# Patient Record
Sex: FEMALE | Race: WHITE | HISPANIC OR LATINO | Employment: FULL TIME
[De-identification: names, ages, dates, MRNs, and addresses within clinical notes are randomized per-mention and may not be internally consistent; named-entity substitution may affect disease eponyms.]

---

## 2018-02-04 ENCOUNTER — CHARTING TRANS (OUTPATIENT)
Dept: OTHER | Age: 23
End: 2018-02-04

## 2018-02-04 ENCOUNTER — IMAGING SERVICES (OUTPATIENT)
Dept: OTHER | Age: 23
End: 2018-02-04

## 2018-02-04 ASSESSMENT — PAIN SCALES - GENERAL: PAINLEVEL_OUTOF10: 8

## 2019-03-06 VITALS
OXYGEN SATURATION: 99 % | HEART RATE: 89 BPM | DIASTOLIC BLOOD PRESSURE: 91 MMHG | RESPIRATION RATE: 16 BRPM | TEMPERATURE: 98.9 F | SYSTOLIC BLOOD PRESSURE: 141 MMHG

## 2021-05-15 ENCOUNTER — EMPLOYEE HEALTH (OUTPATIENT)
Dept: OTHER | Age: 26
End: 2021-05-15

## 2021-05-15 ENCOUNTER — LAB SERVICES (OUTPATIENT)
Dept: LAB | Age: 26
End: 2021-05-15

## 2021-05-15 DIAGNOSIS — Z20.822 SUSPECTED COVID-19 VIRUS INFECTION: ICD-10-CM

## 2021-05-15 DIAGNOSIS — Z20.822 SUSPECTED COVID-19 VIRUS INFECTION: Primary | ICD-10-CM

## 2021-05-15 PROCEDURE — U0005 INFEC AGEN DETEC AMPLI PROBE: HCPCS | Performed by: HOSPITALIST

## 2021-05-15 PROCEDURE — U0003 INFECTIOUS AGENT DETECTION BY NUCLEIC ACID (DNA OR RNA); SEVERE ACUTE RESPIRATORY SYNDROME CORONAVIRUS 2 (SARS-COV-2) (CORONAVIRUS DISEASE [COVID-19]), AMPLIFIED PROBE TECHNIQUE, MAKING USE OF HIGH THROUGHPUT TECHNOLOGIES AS DESCRIBED BY CMS-2020-01-R: HCPCS | Performed by: HOSPITALIST

## 2021-05-16 ENCOUNTER — EMPLOYEE HEALTH (OUTPATIENT)
Dept: OTHER | Age: 26
End: 2021-05-16

## 2021-05-16 LAB
SARS-COV-2 RNA RESP QL NAA+PROBE: NOT DETECTED
SERVICE CMNT-IMP: NORMAL
SERVICE CMNT-IMP: NORMAL

## 2021-05-17 ENCOUNTER — EMPLOYEE HEALTH (OUTPATIENT)
Dept: OTHER | Age: 26
End: 2021-05-17

## 2022-04-25 ENCOUNTER — EMPLOYEE HEALTH (OUTPATIENT)
Dept: OTHER | Facility: HOSPITAL | Age: 27
End: 2022-04-25
Attending: PREVENTIVE MEDICINE

## 2022-04-25 DIAGNOSIS — Z11.1 SCREENING-PULMONARY TB: Primary | ICD-10-CM

## 2022-04-25 PROCEDURE — 86480 TB TEST CELL IMMUN MEASURE: CPT

## 2022-04-27 LAB
M TB IFN-G CD4+ T-CELLS BLD-ACNC: 0.09 IU/ML
M TB TUBERC IFN-G BLD QL: NEGATIVE
M TB TUBERC IGNF/MITOGEN IGNF CONTROL: >10 IU/ML
QFT TB1 AG MINUS NIL: 0.13 IU/ML
QFT TB2 AG MINUS NIL: 0 IU/ML

## 2022-06-02 ENCOUNTER — OFFICE VISIT (OUTPATIENT)
Dept: OBGYN CLINIC | Facility: CLINIC | Age: 27
End: 2022-06-02
Payer: COMMERCIAL

## 2022-06-02 ENCOUNTER — LAB ENCOUNTER (OUTPATIENT)
Dept: LAB | Facility: HOSPITAL | Age: 27
End: 2022-06-02
Attending: OBSTETRICS & GYNECOLOGY
Payer: COMMERCIAL

## 2022-06-02 VITALS — SYSTOLIC BLOOD PRESSURE: 102 MMHG | WEIGHT: 117 LBS | DIASTOLIC BLOOD PRESSURE: 62 MMHG

## 2022-06-02 DIAGNOSIS — Z01.419 WOMEN'S ANNUAL ROUTINE GYNECOLOGICAL EXAMINATION: Primary | ICD-10-CM

## 2022-06-02 DIAGNOSIS — Z11.3 SCREENING FOR STDS (SEXUALLY TRANSMITTED DISEASES): ICD-10-CM

## 2022-06-02 LAB
HBV SURFACE AG SER-ACNC: <0.1 [IU]/L
HBV SURFACE AG SERPL QL IA: NONREACTIVE
HCV AB SERPL QL IA: NONREACTIVE

## 2022-06-02 PROCEDURE — 86694 HERPES SIMPLEX NES ANTBDY: CPT

## 2022-06-02 PROCEDURE — 87591 N.GONORRHOEAE DNA AMP PROB: CPT | Performed by: OBSTETRICS & GYNECOLOGY

## 2022-06-02 PROCEDURE — 86780 TREPONEMA PALLIDUM: CPT

## 2022-06-02 PROCEDURE — 87808 TRICHOMONAS ASSAY W/OPTIC: CPT | Performed by: OBSTETRICS & GYNECOLOGY

## 2022-06-02 PROCEDURE — 87491 CHLMYD TRACH DNA AMP PROBE: CPT | Performed by: OBSTETRICS & GYNECOLOGY

## 2022-06-02 PROCEDURE — 87389 HIV-1 AG W/HIV-1&-2 AB AG IA: CPT

## 2022-06-02 PROCEDURE — 87106 FUNGI IDENTIFICATION YEAST: CPT | Performed by: OBSTETRICS & GYNECOLOGY

## 2022-06-02 PROCEDURE — 36415 COLL VENOUS BLD VENIPUNCTURE: CPT

## 2022-06-02 PROCEDURE — 86695 HERPES SIMPLEX TYPE 1 TEST: CPT

## 2022-06-02 PROCEDURE — 87340 HEPATITIS B SURFACE AG IA: CPT

## 2022-06-02 PROCEDURE — 87205 SMEAR GRAM STAIN: CPT | Performed by: OBSTETRICS & GYNECOLOGY

## 2022-06-02 PROCEDURE — 86803 HEPATITIS C AB TEST: CPT

## 2022-06-02 PROCEDURE — 86696 HERPES SIMPLEX TYPE 2 TEST: CPT

## 2022-06-02 RX ORDER — LISDEXAMFETAMINE DIMESYLATE 20 MG/1
CAPSULE ORAL
COMMUNITY
Start: 2022-06-01

## 2022-06-03 LAB
C TRACH DNA SPEC QL NAA+PROBE: NEGATIVE
HSV 1 GLYCOPROTEIN G, IGG: NEGATIVE
HSV 2 GLYCOPROTEIN G, IGG: NEGATIVE
N GONORRHOEA DNA SPEC QL NAA+PROBE: NEGATIVE
T PALLIDUM AB SER QL: NEGATIVE

## 2022-06-04 LAB
GENITAL VAGINOSIS SCREEN: NEGATIVE
TRICHOMONAS SCREEN: NEGATIVE

## 2022-07-06 ENCOUNTER — TELEPHONE (OUTPATIENT)
Dept: OBGYN CLINIC | Facility: CLINIC | Age: 27
End: 2022-07-06

## 2022-07-06 RX ORDER — FLUCONAZOLE 150 MG/1
150 TABLET ORAL ONCE
Qty: 2 TABLET | Refills: 0 | Status: SHIPPED | OUTPATIENT
Start: 2022-07-06 | End: 2022-07-06

## 2022-07-06 NOTE — TELEPHONE ENCOUNTER
Patient name and  verified. Contacted patient as follow up to genital vaginosis panel. Patient complaining of vaginal discharge and would like Diflucan. Order for Diflucan sent to pharmacy per TA result note.

## 2022-11-17 ENCOUNTER — LAB ENCOUNTER (OUTPATIENT)
Dept: LAB | Age: 27
End: 2022-11-17
Attending: OBSTETRICS & GYNECOLOGY
Payer: COMMERCIAL

## 2022-11-17 ENCOUNTER — OFFICE VISIT (OUTPATIENT)
Dept: OBGYN CLINIC | Facility: CLINIC | Age: 27
End: 2022-11-17
Payer: COMMERCIAL

## 2022-11-17 VITALS — DIASTOLIC BLOOD PRESSURE: 70 MMHG | WEIGHT: 123.81 LBS | SYSTOLIC BLOOD PRESSURE: 122 MMHG

## 2022-11-17 DIAGNOSIS — Z13.1 ENCOUNTER FOR SCREENING EXAMINATION FOR IMPAIRED GLUCOSE REGULATION AND DIABETES MELLITUS: ICD-10-CM

## 2022-11-17 DIAGNOSIS — Z13.1 ENCOUNTER FOR SCREENING EXAMINATION FOR IMPAIRED GLUCOSE REGULATION AND DIABETES MELLITUS: Primary | ICD-10-CM

## 2022-11-17 DIAGNOSIS — Z13.220 SCREENING FOR LIPID DISORDERS: ICD-10-CM

## 2022-11-17 DIAGNOSIS — Z11.3 SCREEN FOR STD (SEXUALLY TRANSMITTED DISEASE): ICD-10-CM

## 2022-11-17 DIAGNOSIS — N76.0 VAGINITIS AND VULVOVAGINITIS: ICD-10-CM

## 2022-11-17 LAB
EST. AVERAGE GLUCOSE BLD GHB EST-MCNC: 100 MG/DL (ref 68–126)
HBA1C MFR BLD: 5.1 % (ref ?–5.7)

## 2022-11-17 PROCEDURE — 3078F DIAST BP <80 MM HG: CPT | Performed by: OBSTETRICS & GYNECOLOGY

## 2022-11-17 PROCEDURE — 83036 HEMOGLOBIN GLYCOSYLATED A1C: CPT

## 2022-11-17 PROCEDURE — 3074F SYST BP LT 130 MM HG: CPT | Performed by: OBSTETRICS & GYNECOLOGY

## 2022-11-17 PROCEDURE — 82947 ASSAY GLUCOSE BLOOD QUANT: CPT

## 2022-11-17 PROCEDURE — 99213 OFFICE O/P EST LOW 20 MIN: CPT | Performed by: OBSTETRICS & GYNECOLOGY

## 2022-11-17 PROCEDURE — 80061 LIPID PANEL: CPT

## 2022-11-17 RX ORDER — LISDEXAMFETAMINE DIMESYLATE 30 MG/1
30 CAPSULE ORAL EVERY MORNING
COMMUNITY
Start: 2022-11-14

## 2022-11-18 LAB
C TRACH DNA SPEC QL NAA+PROBE: NEGATIVE
CHOLEST SERPL-MCNC: 155 MG/DL (ref ?–200)
FASTING PATIENT GLUCOSE ANSWER: YES
FASTING PATIENT LIPID ANSWER: YES
GLUCOSE BLD-MCNC: 95 MG/DL (ref 70–99)
HDLC SERPL-MCNC: 103 MG/DL (ref 40–59)
LDLC SERPL CALC-MCNC: 38 MG/DL (ref ?–100)
N GONORRHOEA DNA SPEC QL NAA+PROBE: NEGATIVE
NONHDLC SERPL-MCNC: 52 MG/DL (ref ?–130)
TRIGL SERPL-MCNC: 73 MG/DL (ref 30–149)
VLDLC SERPL CALC-MCNC: 10 MG/DL (ref 0–30)

## 2023-06-12 ENCOUNTER — APPOINTMENT (OUTPATIENT)
Dept: GENERAL RADIOLOGY | Facility: HOSPITAL | Age: 28
End: 2023-06-12
Payer: COMMERCIAL

## 2023-06-12 ENCOUNTER — APPOINTMENT (OUTPATIENT)
Dept: GENERAL RADIOLOGY | Facility: HOSPITAL | Age: 28
End: 2023-06-12
Attending: EMERGENCY MEDICINE
Payer: COMMERCIAL

## 2023-06-12 ENCOUNTER — HOSPITAL ENCOUNTER (EMERGENCY)
Facility: HOSPITAL | Age: 28
Discharge: HOME | End: 2023-06-12
Attending: EMERGENCY MEDICINE
Payer: COMMERCIAL

## 2023-06-12 VITALS
RESPIRATION RATE: 16 BRPM | WEIGHT: 129.19 LBS | DIASTOLIC BLOOD PRESSURE: 84 MMHG | HEART RATE: 58 BPM | SYSTOLIC BLOOD PRESSURE: 134 MMHG | TEMPERATURE: 98 F | OXYGEN SATURATION: 100 %

## 2023-06-12 DIAGNOSIS — M54.12 CERVICAL RADICULOPATHY: Primary | ICD-10-CM

## 2023-06-12 DIAGNOSIS — R07.89 CHEST PAIN, ATYPICAL: ICD-10-CM

## 2023-06-12 LAB
ALBUMIN SERPL-MCNC: 4 G/DL (ref 3.4–5)
ALBUMIN/GLOB SERPL: 1.1 {RATIO} (ref 1–2)
ALP LIVER SERPL-CCNC: 85 U/L
ALT SERPL-CCNC: 17 U/L
ANION GAP SERPL CALC-SCNC: 3 MMOL/L (ref 0–18)
AST SERPL-CCNC: 16 U/L (ref 15–37)
B-HCG UR QL: NEGATIVE
BASOPHILS # BLD AUTO: 0.04 X10(3) UL (ref 0–0.2)
BASOPHILS NFR BLD AUTO: 0.6 %
BILIRUB SERPL-MCNC: 0.3 MG/DL (ref 0.1–2)
BILIRUB UR QL STRIP.AUTO: NEGATIVE
BUN BLD-MCNC: 6 MG/DL (ref 7–18)
CALCIUM BLD-MCNC: 9.5 MG/DL (ref 8.5–10.1)
CHLORIDE SERPL-SCNC: 104 MMOL/L (ref 98–112)
CLARITY UR REFRACT.AUTO: CLEAR
CO2 SERPL-SCNC: 27 MMOL/L (ref 21–32)
CREAT BLD-MCNC: 0.52 MG/DL
EOSINOPHIL # BLD AUTO: 0.06 X10(3) UL (ref 0–0.7)
EOSINOPHIL NFR BLD AUTO: 0.9 %
ERYTHROCYTE [DISTWIDTH] IN BLOOD BY AUTOMATED COUNT: 12.4 %
GFR SERPLBLD BASED ON 1.73 SQ M-ARVRAT: 130 ML/MIN/1.73M2 (ref 60–?)
GLOBULIN PLAS-MCNC: 3.5 G/DL (ref 2.8–4.4)
GLUCOSE BLD-MCNC: 105 MG/DL (ref 70–99)
GLUCOSE UR STRIP.AUTO-MCNC: NEGATIVE MG/DL
HCT VFR BLD AUTO: 43.7 %
HGB BLD-MCNC: 14.5 G/DL
IMM GRANULOCYTES # BLD AUTO: 0.03 X10(3) UL (ref 0–1)
IMM GRANULOCYTES NFR BLD: 0.5 %
KETONES UR STRIP.AUTO-MCNC: NEGATIVE MG/DL
LEUKOCYTE ESTERASE UR QL STRIP.AUTO: NEGATIVE
LYMPHOCYTES # BLD AUTO: 2.38 X10(3) UL (ref 1–4)
LYMPHOCYTES NFR BLD AUTO: 36.3 %
MCH RBC QN AUTO: 30.6 PG (ref 26–34)
MCHC RBC AUTO-ENTMCNC: 33.2 G/DL (ref 31–37)
MCV RBC AUTO: 92.2 FL
MONOCYTES # BLD AUTO: 0.4 X10(3) UL (ref 0.1–1)
MONOCYTES NFR BLD AUTO: 6.1 %
NEUTROPHILS # BLD AUTO: 3.65 X10 (3) UL (ref 1.5–7.7)
NEUTROPHILS # BLD AUTO: 3.65 X10(3) UL (ref 1.5–7.7)
NEUTROPHILS NFR BLD AUTO: 55.6 %
NITRITE UR QL STRIP.AUTO: NEGATIVE
OSMOLALITY SERPL CALC.SUM OF ELEC: 276 MOSM/KG (ref 275–295)
PH UR STRIP.AUTO: 7 [PH] (ref 5–8)
PLATELET # BLD AUTO: 314 10(3)UL (ref 150–450)
POTASSIUM SERPL-SCNC: 3.9 MMOL/L (ref 3.5–5.1)
PROT SERPL-MCNC: 7.5 G/DL (ref 6.4–8.2)
PROT UR STRIP.AUTO-MCNC: NEGATIVE MG/DL
RBC # BLD AUTO: 4.74 X10(6)UL
RBC #/AREA URNS AUTO: >10 /HPF
SODIUM SERPL-SCNC: 134 MMOL/L (ref 136–145)
SP GR UR STRIP.AUTO: 1.01 (ref 1–1.03)
TROPONIN I HIGH SENSITIVITY: 4 NG/L
UROBILINOGEN UR STRIP.AUTO-MCNC: <2 MG/DL
WBC # BLD AUTO: 6.6 X10(3) UL (ref 4–11)

## 2023-06-12 PROCEDURE — 93010 ELECTROCARDIOGRAM REPORT: CPT

## 2023-06-12 PROCEDURE — 87430 STREP A AG IA: CPT

## 2023-06-12 PROCEDURE — 99285 EMERGENCY DEPT VISIT HI MDM: CPT

## 2023-06-12 PROCEDURE — 36415 COLL VENOUS BLD VENIPUNCTURE: CPT

## 2023-06-12 PROCEDURE — 71046 X-RAY EXAM CHEST 2 VIEWS: CPT | Performed by: EMERGENCY MEDICINE

## 2023-06-12 PROCEDURE — 85025 COMPLETE CBC W/AUTO DIFF WBC: CPT

## 2023-06-12 PROCEDURE — 81001 URINALYSIS AUTO W/SCOPE: CPT | Performed by: EMERGENCY MEDICINE

## 2023-06-12 PROCEDURE — 93005 ELECTROCARDIOGRAM TRACING: CPT

## 2023-06-12 PROCEDURE — 81025 URINE PREGNANCY TEST: CPT

## 2023-06-12 PROCEDURE — 99284 EMERGENCY DEPT VISIT MOD MDM: CPT

## 2023-06-12 PROCEDURE — 87430 STREP A AG IA: CPT | Performed by: EMERGENCY MEDICINE

## 2023-06-12 PROCEDURE — 84484 ASSAY OF TROPONIN QUANT: CPT

## 2023-06-12 PROCEDURE — 80053 COMPREHEN METABOLIC PANEL: CPT

## 2023-06-12 PROCEDURE — 72050 X-RAY EXAM NECK SPINE 4/5VWS: CPT | Performed by: EMERGENCY MEDICINE

## 2023-06-12 PROCEDURE — 85025 COMPLETE CBC W/AUTO DIFF WBC: CPT | Performed by: EMERGENCY MEDICINE

## 2023-06-12 PROCEDURE — 81001 URINALYSIS AUTO W/SCOPE: CPT

## 2023-06-12 PROCEDURE — 80053 COMPREHEN METABOLIC PANEL: CPT | Performed by: EMERGENCY MEDICINE

## 2023-06-12 PROCEDURE — 84484 ASSAY OF TROPONIN QUANT: CPT | Performed by: EMERGENCY MEDICINE

## 2023-06-12 RX ORDER — CYCLOBENZAPRINE HCL 10 MG
10 TABLET ORAL 3 TIMES DAILY PRN
Qty: 20 TABLET | Refills: 0 | Status: SHIPPED | OUTPATIENT
Start: 2023-06-12 | End: 2023-06-19

## 2023-06-12 RX ORDER — HYDROCODONE BITARTRATE AND ACETAMINOPHEN 5; 325 MG/1; MG/1
1-2 TABLET ORAL EVERY 6 HOURS PRN
Qty: 10 TABLET | Refills: 0 | Status: SHIPPED | OUTPATIENT
Start: 2023-06-12 | End: 2023-06-19

## 2023-06-12 RX ORDER — HYDROCODONE BITARTRATE AND ACETAMINOPHEN 5; 325 MG/1; MG/1
2 TABLET ORAL ONCE
Status: COMPLETED | OUTPATIENT
Start: 2023-06-12 | End: 2023-06-12

## 2023-06-12 RX ORDER — NAPROXEN 500 MG/1
500 TABLET ORAL 2 TIMES DAILY PRN
Qty: 20 TABLET | Refills: 0 | Status: SHIPPED | OUTPATIENT
Start: 2023-06-12 | End: 2023-06-19

## 2023-06-12 NOTE — ED INITIAL ASSESSMENT (HPI)
Patient to the ER c/o chest pain with left arm pain. Patient reports that she began having symptoms three days ago. Patient also reports feeling like her lymph nodes are swollen with a sore throat. No fever vomiting x1. No diarrhea. Patient is on vivance and took a lower dose today.

## 2023-06-13 LAB
ATRIAL RATE: 66 BPM
P AXIS: 23 DEGREES
P-R INTERVAL: 152 MS
Q-T INTERVAL: 404 MS
QRS DURATION: 82 MS
QTC CALCULATION (BEZET): 423 MS
R AXIS: 56 DEGREES
T AXIS: 42 DEGREES
VENTRICULAR RATE: 66 BPM

## 2023-06-13 NOTE — ED QUICK NOTES
Report from Nikki Alcaraz RN. Pt resting in bed. States pain feels better. Pt in no acute distress.     She was given iced water per request

## 2023-06-13 NOTE — DISCHARGE INSTRUCTIONS
Follow-up for further evaluation with primary physician call for an appointment. Return if new or worse symptoms. Naprosyn first.  Flexeril as needed. Norco for breakthrough pain.

## 2023-06-13 NOTE — ED QUICK NOTES
Checked with pt if her ride is here, \"they're in the parking lot. \" Advised pt to have her ride come to the desk as she was given 2 tabs [place] in the ED, pt drove self here.  States \"Ok\"

## 2023-07-17 PROBLEM — F90.0 ADHD, PREDOMINANTLY INATTENTIVE TYPE: Status: ACTIVE | Noted: 2019-12-19

## 2023-07-18 ENCOUNTER — OFFICE VISIT (OUTPATIENT)
Dept: OBGYN CLINIC | Facility: CLINIC | Age: 28
End: 2023-07-18

## 2023-07-18 VITALS — SYSTOLIC BLOOD PRESSURE: 98 MMHG | WEIGHT: 127.19 LBS | DIASTOLIC BLOOD PRESSURE: 64 MMHG

## 2023-07-18 DIAGNOSIS — Z01.419 ENCOUNTER FOR WELL WOMAN EXAM WITH ROUTINE GYNECOLOGICAL EXAM: Primary | ICD-10-CM

## 2023-07-18 DIAGNOSIS — Z11.3 SCREENING FOR STDS (SEXUALLY TRANSMITTED DISEASES): ICD-10-CM

## 2023-07-18 PROCEDURE — 3074F SYST BP LT 130 MM HG: CPT | Performed by: OBSTETRICS & GYNECOLOGY

## 2023-07-18 PROCEDURE — 3078F DIAST BP <80 MM HG: CPT | Performed by: OBSTETRICS & GYNECOLOGY

## 2023-07-18 PROCEDURE — 99395 PREV VISIT EST AGE 18-39: CPT | Performed by: OBSTETRICS & GYNECOLOGY

## 2023-07-18 NOTE — PROGRESS NOTES
[facility], [facility]  Obstetrics and Gynecology  Gynecology Visit    Patient presents with: Annual          Lise Garg is a 28year old female who presents for Annual exam.    LMP: 2023. Menses regular: Yes. Menstrual flow normal: Moderate. Birth control: No.   Refill n/a  Last Pap Smear: 2022. Any history of abnormal paps: Yes   Last MMG: n/a  Sleep: 6hrs. Diet: Fair. Exercise: Yes. Screening labs/Blood work today: No.     Colonoscopy (if over 40 y/o): n/a. Gardasil:(age 10-37 y/o) Completed. Genetic Cancer screen (if indicated): No.   Flu (Aug-April): 10/25/2022. TDAP (every 10 years) 2019. Additional Problems/concerns: Pt reports no concerns at this time. Next Appt: Wait schedule on [employer]. Immunization History   Administered Date(s) Administered    Covid-19 Vaccine Pfizer 30 mcg/0.3 ml 2021, 2021    Hpv Virus Vaccine 9 [facility] Im 2021, 2021, 2022    Influenza 10/25/2019, 10/27/2021    TDAP 2019         Current Outpatient Medications:     VYVANSE 30 MG Oral Cap, Take 1 capsule (30 mg total) by mouth every morning., Disp: , Rfl:       Sulfa Antibiotics       HIVES    OB History     T0    L0    SAB1  IAB1  Ectopic0  Multiple0  Live Births0     Name of Baby 1: Not recorded    Date: Not recorded     GA: Not recorded     Delivery: Not recorded    Selvin Singhee: Not recorded     Wade Lin: Not recorded    Living: Not recorded    Name of Baby 2: Not recorded    Date: Not recorded     GA: Not recorded     Delivery: Not recorded    Apgar1: Not recorded     Apgar5: Not recorded    Living: Not recorded      History reviewed. No pertinent past medical history.     Past Surgical History:   Procedure Laterality Date    WISDOM TEETH REMOVED      2019       Family History   Problem Relation Age of Onset    Diabetes Maternal Grandmother     Diabetes Maternal Grandfather         Tobacco  Allergies  Meds  Med Hx  Surg Hx  Fam Hx  Soc Hx Social History    Socioeconomic History      Marital status: Unknown      Spouse name: Not on file      Number of children: Not on file      Years of education: Not on file      Highest education level: Not on file    Occupational History      Not on file    Tobacco Use      Smoking status: Some Days      Smokeless tobacco: Current    Substance and Sexual Activity      Alcohol use: Yes        Comment: Occ. Drug use: Never      Sexual activity: Not on file    Other Topics      Concerns:        Not on file    Social History Narrative      Not on file    Social Determinants of Health  Financial Resource Strain: Not on file  Food Insecurity: Not on file  Transportation Needs: Not on file  Physical Activity: Not on file  Stress: Not on file  Social Connections: Not on file  Housing Stability: Not on file    BP 98/64   Wt 127 lb 3.3 oz (57.7 kg)   LMP 07/05/2023     Wt Readings from Last 3 Encounters:  07/18/23 : 127 lb 3.3 oz (57.7 kg)  06/12/23 : 129 lb 3 oz (58.6 kg)  11/17/22 : 123 lb 12.8 oz (56.2 kg)        Health Maintenance   Topic Date Due    Influenza Vaccine (1) 08/01/2021    Screen for Cervical Cancer 11/05/2021    DTaP,Tdap and Td Vaccines (3 - Td or Tdap) 03/18/2025    Hepatitis C Screening Completed    HIV Screening Completed    COVID-19 Vaccine Completed     Review of Systems   General: Present- Feeling well. Not Present- Chills, Fever, Weight Gain and Weight Loss. HEENT: Not Present- Headache and Sore Throat. Respiratory: Not Present- Cough, Difficulty Breathing, Hemoptysis and Sputum Production. Cardiovascular: Not Present- Chest Pain, Elevated Blood Pressure, Fainting / Blacking Out and Shortness of Breath. Gastrointestinal: Not Present- Constipation, Diarrhea, Nausea and Vomiting. Female Genitourinary: Not Present- Discharge, Dysuria and Frequency. Musculoskeletal: Not Present- Leg Cramps and Swelling of Extremities.   Neurological: Not Present- Dizziness and Headaches. Psychiatric: Not Present- Anxiety and Depression. Endocrine: Not Present- Appetite Changes, Hair Changes and Thyroid Problems. Hematology: Not Present- Easy Bruising and Excessive bleeding. All other systems negative     Physical Exam The physical exam findings are as follows:     General   Mental Status - Alert. General Appearance - Cooperative. Orientation - Oriented X4. Build & Nutrition - Well nourished. Head and Neck  Thyroid   Gland Characteristics - normal size and consistency. Chest and Lung Exam   Inspection:   Chest Wall: - Normal.  Percussion:   Quality and Intensity: - Percussion normal.  Palpation: - Palpation normal.  Auscultation:   Breath sounds: - Normal.  Adventitious sounds: - No Adventitious sounds. Breast   Nipples: Characteristics - Bilateral - Normal. Discharge - Bilateral - None. Breast - Bilateral - Symmetric. Cardiovascular   Auscultation: Rhythm - Regular. Heart Sounds - Normal heart sounds. Murmurs & Other Heart Sounds: Auscultation of the heart reveals - No Murmurs. Abdomen   Inspection: Inspection of the abdomen reveals - No Hernias. Incisional scars - no incisional scars. Palpation/Percussion: Palpation and Percussion of the abdomen reveal - Non Tender and No Palpable abdominal masses. Liver: - Normal.  Auscultation: Auscultation of the abdomen reveals - Bowel sounds normal.    Female Genitourinary     External Genitalia   Perineum - Normal. Bartholin's Gland - Bilateral - Normal. Clitoris - Normal.  Introitus: Characteristics - No Cystocele, Enterocele or Rectocele. Discharge - None. Labia Majora: Lesions - Bilateral - None. Characteristics - Bilateral - Normal.  Labia Minora: Lesions - Bilateral - None. Characteristics - Bilateral - Normal.  Urethra: Characteristics - Normal. Discharge - None. [place] Gland - Bilateral - Normal.  Vulva: Characteristics - Normal. Lesions - None.     Speculum & Bimanual   Vagina:   Vaginal Wall: - Normal.  Vaginal Lesions - None. Vaginal Mucosa - Normal.  Cervix: Characteristics - No Motion tenderness. Discharge - None. Uterus: Characteristics - Normal. Position - Midposition. Adnexa: Characteristics - Bilateral - Normal. Masses - No Adnexal Masses. Wet [place]: pH - 3.8-4.2. Vaginal discharge - Clear  and Thin. Amine Odor - Absent. Main patient complaints - Discharge. Microscopy - Lactobacilli and Epithelial cells. Rectal   Anorectal Exam: External - normal external exam.    Peripheral Vascular   Upper Extremity:   Palpation: - Pulses bilaterally normal.  Lower Extremity: Inspection - Bilateral - Inspection Normal.  Palpation: Edema - Bilateral - No edema. Neurologic   Mental Status: - Normal.    Lymphatic  General Lymphatics   Description - Normal .       1. Encounter for well woman exam with routine gynecological exam  - THINPREP PAP WITH HPV REFLEX REQUEST B; Future    2. Screening for STDs (sexually transmitted diseases)  - HIV AG AB COMBO; Future  - T PALLIDUM SCREENING CASCADE; Future  - HCV ANTIBODY; Future  - HEPATITIS B SURFACE ANTIGEN; Future  - VAGINITIS/VAGINOSIS, DNA PROBE  - CHLAMYDIA/GONOCOCCUS, KIERRA;  Future

## 2023-07-19 LAB
C TRACH DNA SPEC QL NAA+PROBE: NEGATIVE
N GONORRHOEA DNA SPEC QL NAA+PROBE: NEGATIVE

## 2023-08-03 LAB — HPV I/H RISK 1 DNA SPEC QL NAA+PROBE: NEGATIVE

## 2023-12-05 ENCOUNTER — APPOINTMENT (OUTPATIENT)
Dept: ULTRASOUND IMAGING | Age: 28
End: 2023-12-05
Attending: PHYSICIAN ASSISTANT
Payer: COMMERCIAL

## 2023-12-05 ENCOUNTER — HOSPITAL ENCOUNTER (OUTPATIENT)
Age: 28
Discharge: HOME | End: 2023-12-05
Payer: COMMERCIAL

## 2023-12-05 VITALS
SYSTOLIC BLOOD PRESSURE: 113 MMHG | DIASTOLIC BLOOD PRESSURE: 74 MMHG | HEART RATE: 86 BPM | OXYGEN SATURATION: 100 % | TEMPERATURE: 98 F | RESPIRATION RATE: 16 BRPM | HEIGHT: 59 IN | BODY MASS INDEX: 25.2 KG/M2 | WEIGHT: 125 LBS

## 2023-12-05 DIAGNOSIS — R10.2 SUPRAPUBIC PAIN: Primary | ICD-10-CM

## 2023-12-05 DIAGNOSIS — N89.8 VAGINAL DISCHARGE: ICD-10-CM

## 2023-12-05 DIAGNOSIS — N89.8 VAGINAL LESION: ICD-10-CM

## 2023-12-05 LAB
B-HCG UR QL: NEGATIVE
BILIRUB UR QL STRIP: NEGATIVE
CLARITY UR: CLEAR
COLOR UR: YELLOW
GLUCOSE UR STRIP-MCNC: NEGATIVE MG/DL
KETONES UR STRIP-MCNC: NEGATIVE MG/DL
LEUKOCYTE ESTERASE UR QL STRIP: NEGATIVE
NITRITE UR QL STRIP: NEGATIVE
PH UR STRIP: 7 [PH]
PROT UR STRIP-MCNC: NEGATIVE MG/DL
SP GR UR STRIP: 1.01
UROBILINOGEN UR STRIP-ACNC: <2 MG/DL

## 2023-12-05 PROCEDURE — 87491 CHLMYD TRACH DNA AMP PROBE: CPT | Performed by: PHYSICIAN ASSISTANT

## 2023-12-05 PROCEDURE — 87086 URINE CULTURE/COLONY COUNT: CPT | Performed by: PHYSICIAN ASSISTANT

## 2023-12-05 PROCEDURE — 87529 HSV DNA AMP PROBE: CPT | Performed by: PHYSICIAN ASSISTANT

## 2023-12-05 PROCEDURE — 81002 URINALYSIS NONAUTO W/O SCOPE: CPT

## 2023-12-05 PROCEDURE — 99215 OFFICE O/P EST HI 40 MIN: CPT

## 2023-12-05 PROCEDURE — 96372 THER/PROPH/DIAG INJ SC/IM: CPT

## 2023-12-05 PROCEDURE — 87591 N.GONORRHOEAE DNA AMP PROB: CPT | Performed by: PHYSICIAN ASSISTANT

## 2023-12-05 PROCEDURE — 81514 NFCT DS BV&VAGINITIS DNA ALG: CPT | Performed by: PHYSICIAN ASSISTANT

## 2023-12-05 PROCEDURE — 81025 URINE PREGNANCY TEST: CPT

## 2023-12-05 PROCEDURE — 99214 OFFICE O/P EST MOD 30 MIN: CPT

## 2023-12-05 PROCEDURE — 76856 US EXAM PELVIC COMPLETE: CPT | Performed by: PHYSICIAN ASSISTANT

## 2023-12-05 PROCEDURE — 93975 VASCULAR STUDY: CPT | Performed by: PHYSICIAN ASSISTANT

## 2023-12-05 RX ORDER — ACETAMINOPHEN 500 MG
500 TABLET ORAL EVERY 6 HOURS PRN
COMMUNITY

## 2023-12-05 RX ORDER — KETOROLAC TROMETHAMINE 30 MG/ML
15 INJECTION, SOLUTION INTRAMUSCULAR; INTRAVENOUS ONCE
Status: COMPLETED | OUTPATIENT
Start: 2023-12-05 | End: 2023-12-05

## 2023-12-05 NOTE — ED INITIAL ASSESSMENT (HPI)
Pt with suprapubic pain x 4-5 days, painful urination, white vaginal discharge and R back and RLQ pain  (feels like RLQ is swollen) x 2 days; today with fever to 101f

## 2023-12-05 NOTE — DISCHARGE INSTRUCTIONS
You will be notified of your lab results within 48-72 hours. If prescriptions are needed at that time, they will be sent to your pharmacy of choice. Go to the Emergency department for any worsening symptoms.

## 2023-12-06 LAB
BV BACTERIA DNA VAG QL NAA+PROBE: NEGATIVE
C GLABRATA DNA VAG QL NAA+PROBE: NEGATIVE
C KRUSEI DNA VAG QL NAA+PROBE: NEGATIVE
C TRACH DNA SPEC QL NAA+PROBE: NEGATIVE
CANDIDA DNA VAG QL NAA+PROBE: NEGATIVE
N GONORRHOEA DNA SPEC QL NAA+PROBE: NEGATIVE
T VAGINALIS DNA VAG QL NAA+PROBE: NEGATIVE

## 2023-12-07 ENCOUNTER — TELEPHONE (OUTPATIENT)
Dept: URGENT CARE | Age: 28
End: 2023-12-07

## 2023-12-07 RX ORDER — VALACYCLOVIR HYDROCHLORIDE 1 G/1
1000 TABLET, FILM COATED ORAL EVERY 12 HOURS
Qty: 20 TABLET | Refills: 0 | Status: SHIPPED | OUTPATIENT
Start: 2023-12-07 | End: 2023-12-17

## 2023-12-08 LAB
HSV 1 NAA: POSITIVE
HSV 1 NAA: POSITIVE
HSV 2 NAA: NEGATIVE
HSV 2 NAA: NEGATIVE

## 2023-12-08 NOTE — ED NOTES
Patient called stating vaginal lesion is spreading and more painful. Chart reviewed by TREY Benson still in process. Valcyclovir eprescribed to pharmacy on file.    Voiced understanding

## 2024-02-19 ENCOUNTER — HOSPITAL ENCOUNTER (OUTPATIENT)
Age: 29
Discharge: HOME | End: 2024-02-19
Payer: COMMERCIAL

## 2024-02-19 ENCOUNTER — TELEPHONE (OUTPATIENT)
Dept: OBGYN CLINIC | Facility: CLINIC | Age: 29
End: 2024-02-19

## 2024-02-19 VITALS
HEIGHT: 59 IN | HEART RATE: 83 BPM | SYSTOLIC BLOOD PRESSURE: 136 MMHG | DIASTOLIC BLOOD PRESSURE: 71 MMHG | RESPIRATION RATE: 20 BRPM | TEMPERATURE: 99 F | OXYGEN SATURATION: 99 % | BODY MASS INDEX: 24.19 KG/M2 | WEIGHT: 120 LBS

## 2024-02-19 DIAGNOSIS — N92.6 MISSED MENSES: Primary | ICD-10-CM

## 2024-02-19 LAB — B-HCG UR QL: NEGATIVE

## 2024-02-19 NOTE — TELEPHONE ENCOUNTER
Lmp 01/05/24. Pt regular with her cycles. Pt took Plan B on 01/13/24 and had light spotting after taking. Pt has not had her menses yet for February. Pt took at home pregnancy test and had urine pregnancy test done at [facility] which was negative. Pt was advised to get a hold of her OB/Gyn and have a Hcg level done. Order placed for Hcg. Pt informed to complete test.

## 2024-02-19 NOTE — ED INITIAL ASSESSMENT (HPI)
Pt presents to the IC with c/o no period since the beginning of January. Pt did take plan B on 1/13/24 but only has had spotting since and not a full period.

## 2024-02-19 NOTE — ED PROVIDER NOTES
Patient Seen in: [facility]      History     Chief Complaint   Patient presents with    Gynecologic Exam     Late period - Entered by patient     Stated Complaint: Gynecologic Exam - Late period    Subjective:   HPI    28-year-old female presents requesting a blood test for pregnancy.  She states that she missed her period in January.  She does report taking Plan B on January 13 and has had spotting but no full period.  No pain.  No urinary complaints.  Home pregnancy test was negative.    Objective:   History reviewed. No pertinent past medical history.           Past Surgical History:   Procedure Laterality Date    WISDOM TEETH REMOVED      2019                Social History     Socioeconomic History    Marital status: Single   Tobacco Use    Smoking status: Some Days    Smokeless tobacco: Current   Substance and Sexual Activity    Alcohol use: Yes     Comment: Occ.     Drug use: Never              Review of Systems    Positive for stated complaint: Gynecologic Exam - Late period  Other systems are as noted in HPI.  Constitutional and vital signs reviewed.      All other systems reviewed and negative except as noted above.    Physical Exam     ED Triage Vitals [02/19/24 1123]   /71   Pulse 83   Resp 20   Temp 98.8 °F (37.1 °C)   Temp src Temporal   SpO2 99 %   O2 Device None (Room air)       Current:/71   Pulse 83   Temp 98.8 °F (37.1 °C) (Temporal)   Resp 20   Ht 149.9 cm (4' 11\")   Wt 54.4 kg   LMP 01/05/2024 (Approximate)   SpO2 99%   BMI 24.24 kg/m²         Physical Exam  Vitals and nursing note reviewed.   Constitutional:       Appearance: Normal appearance.   Abdominal:      Tenderness: There is no abdominal tenderness.   Skin:     General: Skin is warm and dry.   Neurological:      Mental Status: She is alert and oriented to person, place, and time.               ED Course     Labs Reviewed   POCT PREGNANCY URINE - Normal                      MDM      28-year-old female  presents with missed period  Amenorrhea /dysfunctional uterine bleeding versus pregnancy  Urine pregnancy is negative  No abdominal pain no fever  Patient to contact her OB/GYN for appointment to obtain a blood test.  She will contact her for outpatient quantitative hCG if needed.  For any new or worsening symptoms patient will go to the ER                               Medical Decision Making  Problems Addressed:  Missed menses: acute illness or injury    Risk  OTC drugs.        Disposition and Plan     Clinical Impression:  1. Missed menses         Disposition:  Discharge  2/19/2024 12:00 pm    Follow-up:  Jimy Ho MD  [facility] [address]  [phone]    Schedule an appointment as soon as possible for a visit   As needed          Medications Prescribed:  Current Discharge Medication List

## 2024-02-19 NOTE — DISCHARGE INSTRUCTIONS
Repeat urine pregnancy test in a few days or follow-up with your OB/GYN for quantitative hCG blood test Go to the emergency room for any new or worsening symptoms such as severe abdominal pain cramping

## 2024-02-19 NOTE — TELEPHONE ENCOUNTER
Patient was seen at immediate care today because her period is late. They suggested she call this office to request an order for lab work. Please advise.

## 2024-02-20 ENCOUNTER — LAB ENCOUNTER (OUTPATIENT)
Dept: LAB | Age: 29
End: 2024-02-20
Attending: OBSTETRICS & GYNECOLOGY
Payer: COMMERCIAL

## 2024-02-20 DIAGNOSIS — N92.6 MISSED MENSES: ICD-10-CM

## 2024-02-20 LAB — B-HCG SERPL-ACNC: <2.6 MIU/ML

## 2024-02-20 PROCEDURE — 84702 CHORIONIC GONADOTROPIN TEST: CPT

## 2024-02-20 PROCEDURE — 36415 COLL VENOUS BLD VENIPUNCTURE: CPT

## 2024-02-26 ENCOUNTER — HOSPITAL ENCOUNTER (OUTPATIENT)
Age: 29
Discharge: HOME | End: 2024-02-26
Payer: COMMERCIAL

## 2024-02-26 VITALS
HEART RATE: 91 BPM | TEMPERATURE: 97 F | SYSTOLIC BLOOD PRESSURE: 130 MMHG | RESPIRATION RATE: 20 BRPM | OXYGEN SATURATION: 100 % | DIASTOLIC BLOOD PRESSURE: 81 MMHG

## 2024-02-26 DIAGNOSIS — R68.83 CHILLS: ICD-10-CM

## 2024-02-26 DIAGNOSIS — J06.9 UPPER RESPIRATORY TRACT INFECTION, UNSPECIFIED TYPE: ICD-10-CM

## 2024-02-26 DIAGNOSIS — J20.9 ACUTE BRONCHITIS WITH BRONCHOSPASM: Primary | ICD-10-CM

## 2024-02-26 LAB
POCT INFLUENZA A: NEGATIVE
POCT INFLUENZA B: NEGATIVE
S PYO AG THROAT QL: NEGATIVE

## 2024-02-26 PROCEDURE — 87880 STREP A ASSAY W/OPTIC: CPT | Performed by: NURSE PRACTITIONER

## 2024-02-26 PROCEDURE — 99213 OFFICE O/P EST LOW 20 MIN: CPT | Performed by: NURSE PRACTITIONER

## 2024-02-26 PROCEDURE — 87502 INFLUENZA DNA AMP PROBE: CPT | Performed by: NURSE PRACTITIONER

## 2024-02-26 RX ORDER — BENZONATATE 200 MG/1
200 CAPSULE ORAL 3 TIMES DAILY PRN
Qty: 15 CAPSULE | Refills: 0 | Status: SHIPPED | OUTPATIENT
Start: 2024-02-26 | End: 2024-02-26

## 2024-02-26 RX ORDER — ALBUTEROL SULFATE 90 UG/1
2 AEROSOL, METERED RESPIRATORY (INHALATION) EVERY 4 HOURS PRN
Qty: 1 EACH | Refills: 0 | Status: SHIPPED | OUTPATIENT
Start: 2024-02-26 | End: 2024-02-26

## 2024-02-26 RX ORDER — BENZONATATE 200 MG/1
200 CAPSULE ORAL 3 TIMES DAILY PRN
Qty: 15 CAPSULE | Refills: 0 | Status: SHIPPED | OUTPATIENT
Start: 2024-02-26

## 2024-02-26 RX ORDER — NAPROXEN 500 MG/1
500 TABLET ORAL 2 TIMES DAILY PRN
Qty: 20 TABLET | Refills: 0 | Status: SHIPPED | OUTPATIENT
Start: 2024-02-26 | End: 2024-03-07

## 2024-02-26 RX ORDER — ALBUTEROL SULFATE 90 UG/1
2 AEROSOL, METERED RESPIRATORY (INHALATION) EVERY 4 HOURS PRN
Qty: 1 EACH | Refills: 0 | Status: SHIPPED | OUTPATIENT
Start: 2024-02-26 | End: 2024-03-27

## 2024-02-26 RX ORDER — NAPROXEN 500 MG/1
500 TABLET ORAL 2 TIMES DAILY PRN
Qty: 20 TABLET | Refills: 0 | Status: SHIPPED | OUTPATIENT
Start: 2024-02-26 | End: 2024-02-26

## 2024-02-26 NOTE — ED PROVIDER NOTES
Patient Seen in: [facility]      History     Chief Complaint   Patient presents with    Cough    Sore Throat     Stated Complaint: Cold - Cough for a week, fever and chills    Subjective:   HPI  Patient is an 28-year-old female that presents to the immediate care center today with concern for fever, chills, cough, congestion, and sore throat that started a week.  Her boyfriend tested positive today for influenza.  Pt. has been eating and drinking without difficulty.  She feels wheezy when supine. She has had no abdominal or back pain; no headache or dizziness.        Objective:   History reviewed. No pertinent past medical history.           Past Surgical History:   Procedure Laterality Date    WISDOM TEETH REMOVED      2019                Social History     Socioeconomic History    Marital status: Single   Tobacco Use    Smoking status: Some Days    Smokeless tobacco: Current   Substance and Sexual Activity    Alcohol use: Yes     Comment: Occ.     Drug use: Never              Review of Systems   Constitutional:  Positive for fever. Negative for appetite change and chills.   HENT:  Positive for congestion, sinus pressure and sore throat.    Respiratory:  Positive for cough.    Gastrointestinal:  Negative for abdominal pain.   Musculoskeletal:  Negative for arthralgias and myalgias.   Skin:  Negative for rash.   Neurological:  Negative for dizziness, weakness and headaches.       Positive for stated complaint: Cold - Cough for a week, fever and chills  Other systems are as noted in HPI.  Constitutional and vital signs reviewed.      All other systems reviewed and negative except as noted above.    Physical Exam     ED Triage Vitals [02/26/24 0912]   /81   Pulse 91   Resp 20   Temp 97.4 °F (36.3 °C)   Temp src Temporal   SpO2 100 %   O2 Device None (Room air)       Current:/81   Pulse 91   Temp 97.4 °F (36.3 °C) (Temporal)   Resp 20   LMP 02/22/2024 (Approximate)   SpO2 100%          Physical Exam  Vitals and nursing note reviewed.   Constitutional:       General: She is not in acute distress.     Appearance: She is not ill-appearing.   HENT:      Right Ear: Tympanic membrane and ear canal normal.      Left Ear: Tympanic membrane and ear canal normal.      Nose: Nose normal.      Mouth/Throat:      Pharynx: Uvula midline. Posterior oropharyngeal erythema present. No uvula swelling.      Tonsils: No tonsillar exudate. 0 on the right. 0 on the left.   Eyes:      Conjunctiva/sclera: Conjunctivae normal.   Pulmonary:      Effort: Pulmonary effort is normal. No respiratory distress.      Breath sounds: Normal breath sounds.   Musculoskeletal:      Cervical back: Normal range of motion and neck supple.   Skin:     General: Skin is warm and dry.      Findings: No rash.   Neurological:      Mental Status: She is alert and oriented to person, place, and time.               ED Course     Labs Reviewed   POCT RAPID STREP - Normal   POCT FLU TEST - Normal    Narrative:     This assay is a rapid molecular in vitro test utilizing nucleic acid amplification of influenza A and B viral RNA.                      MDM                                         Medical Decision Making  Differential diagnoses considered included, but are not exclusive of: bacterial vs viral sinusitis, dehydration, pneumonia, influenza, Covid-19 infection, and other viral upper respiratory infection.       Amount and/or Complexity of Data Reviewed  Labs:  Decision-making details documented in ED Course.    Risk  OTC drugs.  Prescription drug management.        Disposition and Plan     Clinical Impression:  1. Acute bronchitis with bronchospasm    2. Chills    3. Upper respiratory tract infection, unspecified type         Disposition:  Discharge  2/26/2024  9:46 am    Follow-up:  Jimy Ho MD  [address] [facility] [address] [phone]    Schedule an appointment as soon as possible for a visit in 1  week  As needed          Medications Prescribed:  Current Discharge Medication List        START taking these medications    Details   albuterol 108 (90 Base) MCG/ACT Inhalation Aero Soln Inhale 2 puffs into the lungs every 4 (four) hours as needed for Wheezing.  Qty: 1 each, Refills: 0      benzonatate 200 MG Oral Cap Take 1 capsule (200 mg total) by mouth 3 (three) times daily as needed for cough.  Qty: 15 capsule, Refills: 0      naproxen 500 MG Oral Tab Take 1 tablet (500 mg total) by mouth 2 (two) times daily as needed.  Qty: 20 tablet, Refills: 0

## 2024-02-26 NOTE — ED INITIAL ASSESSMENT (HPI)
Pt with cough, sore throat, chills, and fever (t-max 102) x1 wk; testing negative to covid each day; last motrin at 6am

## 2024-03-12 ENCOUNTER — HOSPITAL ENCOUNTER (OUTPATIENT)
Age: 29
Discharge: HOME | End: 2024-03-12
Attending: EMERGENCY MEDICINE
Payer: COMMERCIAL

## 2024-03-12 ENCOUNTER — APPOINTMENT (OUTPATIENT)
Dept: GENERAL RADIOLOGY | Age: 29
End: 2024-03-12
Attending: EMERGENCY MEDICINE
Payer: COMMERCIAL

## 2024-03-12 VITALS
SYSTOLIC BLOOD PRESSURE: 112 MMHG | DIASTOLIC BLOOD PRESSURE: 66 MMHG | RESPIRATION RATE: 16 BRPM | OXYGEN SATURATION: 100 % | TEMPERATURE: 99 F | HEART RATE: 70 BPM

## 2024-03-12 DIAGNOSIS — R05.3 PERSISTENT COUGH FOR 3 WEEKS OR LONGER: Primary | ICD-10-CM

## 2024-03-12 LAB
#MXD IC: 0.4 X10ˆ3/UL (ref 0.1–1)
ATRIAL RATE: 74 BPM
BUN BLD-MCNC: <5 MG/DL (ref 7–18)
CHLORIDE BLD-SCNC: 102 MMOL/L (ref 98–112)
CO2 BLD-SCNC: 25 MMOL/L (ref 21–32)
CREAT BLD-MCNC: 0.4 MG/DL
DDIMER WHOLE BLOOD: <200 NG/ML DDU (ref ?–400)
EGFRCR SERPLBLD CKD-EPI 2021: 137 ML/MIN/1.73M2 (ref 60–?)
GLUCOSE BLD-MCNC: 97 MG/DL (ref 70–99)
HCT VFR BLD AUTO: 41.4 %
HCT VFR BLD CALC: 44 %
HGB BLD-MCNC: 13.5 G/DL
ISTAT IONIZED CALCIUM FOR CHEM 8: 1.23 MMOL/L (ref 1.12–1.32)
LYMPHOCYTES # BLD AUTO: 2.2 X10ˆ3/UL (ref 1–4)
LYMPHOCYTES NFR BLD AUTO: 30.4 %
MCH RBC QN AUTO: 30.1 PG (ref 26–34)
MCHC RBC AUTO-ENTMCNC: 32.6 G/DL (ref 31–37)
MCV RBC AUTO: 92.2 FL (ref 80–100)
MIXED CELL %: 5.3 %
NEUTROPHILS # BLD AUTO: 4.7 X10ˆ3/UL (ref 1.5–7.7)
NEUTROPHILS NFR BLD AUTO: 64.3 %
P AXIS: 42 DEGREES
P-R INTERVAL: 154 MS
PLATELET # BLD AUTO: 350 X10ˆ3/UL (ref 150–450)
POTASSIUM BLD-SCNC: 4.1 MMOL/L (ref 3.6–5.1)
Q-T INTERVAL: 388 MS
QRS DURATION: 78 MS
QTC CALCULATION (BEZET): 430 MS
R AXIS: 59 DEGREES
RBC # BLD AUTO: 4.49 X10ˆ6/UL
SODIUM BLD-SCNC: 138 MMOL/L (ref 136–145)
T AXIS: 46 DEGREES
TROPONIN I BLD-MCNC: <0.02 NG/ML
VENTRICULAR RATE: 74 BPM
WBC # BLD AUTO: 7.3 X10ˆ3/UL (ref 4–11)

## 2024-03-12 PROCEDURE — 84484 ASSAY OF TROPONIN QUANT: CPT

## 2024-03-12 PROCEDURE — 93010 ELECTROCARDIOGRAM REPORT: CPT

## 2024-03-12 PROCEDURE — 85025 COMPLETE CBC W/AUTO DIFF WBC: CPT | Performed by: EMERGENCY MEDICINE

## 2024-03-12 PROCEDURE — 71046 X-RAY EXAM CHEST 2 VIEWS: CPT | Performed by: EMERGENCY MEDICINE

## 2024-03-12 PROCEDURE — 99215 OFFICE O/P EST HI 40 MIN: CPT

## 2024-03-12 PROCEDURE — 85378 FIBRIN DEGRADE SEMIQUANT: CPT | Performed by: EMERGENCY MEDICINE

## 2024-03-12 PROCEDURE — 36415 COLL VENOUS BLD VENIPUNCTURE: CPT

## 2024-03-12 PROCEDURE — 80047 BASIC METABLC PNL IONIZED CA: CPT

## 2024-03-12 PROCEDURE — 93005 ELECTROCARDIOGRAM TRACING: CPT

## 2024-03-12 RX ORDER — AZITHROMYCIN 250 MG/1
TABLET, FILM COATED ORAL
Qty: 6 TABLET | Refills: 0 | Status: SHIPPED | OUTPATIENT
Start: 2024-03-12 | End: 2024-03-17

## 2024-03-12 RX ORDER — ALBUTEROL SULFATE 90 UG/1
2 AEROSOL, METERED RESPIRATORY (INHALATION) EVERY 4 HOURS PRN
Qty: 1 EACH | Refills: 0 | Status: SHIPPED | OUTPATIENT
Start: 2024-03-12 | End: 2024-04-11

## 2024-03-12 RX ORDER — PREDNISONE 20 MG/1
40 TABLET ORAL DAILY
Qty: 10 TABLET | Refills: 0 | Status: SHIPPED | OUTPATIENT
Start: 2024-03-12 | End: 2024-03-17

## 2024-03-12 NOTE — ED PROVIDER NOTES
Patient Seen in: [facility] [place]      History     Chief Complaint   Patient presents with    Cough     Cough for a month with chest pain/ pressure - Entered by patient     Stated Complaint: Cough - Cough for a month with chest pain/ pressure    Subjective:   HPI    The patient is a 28-year-old female with no significant past medical history who presents now with persistent cough, \"chest tightness\", mild shortness of breath.  The patient states that she began experiencing a cough approximately 1 month ago.  After approximately 1 week, the patient was seen by her primary MD and had negative COVID, negative strep, negative flu testing done.  Patient was prescribed Naprosyn and an inhaler.  Despite this, the patient states she continues to have a persistent cough.  Over the last several days, the patient has developed some \"chest tightness\" associated with some transient shortness of breath.  The patient states the symptoms are worst when she is experiencing a spasm of coughing.  The patient states she has some mild chest tightness and minimal shortness of breath throughout the day today.  Patient denies any known family history of premature coronary disease or clotting disorders.    Objective:   History reviewed. No pertinent past medical history.           Past Surgical History:   Procedure Laterality Date    WISDOM TEETH REMOVED      2019                Social History     Socioeconomic History    Marital status: Single   Tobacco Use    Smoking status: Some Days    Smokeless tobacco: Current   Substance and Sexual Activity    Alcohol use: Yes     Comment: Occ.     Drug use: Never              Review of Systems    Positive for stated complaint: Cough - Cough for a month with chest pain/ pressure  Other systems are as noted in HPI.  Constitutional and vital signs reviewed.      All other systems reviewed and negative except as noted above.    Physical Exam     ED Triage Vitals [03/12/24 1401]   /66    Pulse 70   Resp 16   Temp 98.5 °F (36.9 °C)   Temp src Temporal   SpO2 100 %   O2 Device None (Room air)       Current:/66   Pulse 70   Temp 98.5 °F (36.9 °C) (Temporal)   Resp 16   LMP 02/22/2024 (Approximate)   SpO2 100%         Physical Exam    Constitutional: Well-developed well-nourished in no acute distress  Head: Normocephalic, no swelling or tenderness  Eyes: Nonicteric sclera, no conjunctival injection  ENT: TMs are clear and flat bilaterally.  There is no posterior pharyngeal erythema  Chest: Clear to auscultation, no tenderness  Cardiovascular: Regular rate and rhythm without murmur  Abdomen: Soft, nontender and nondistended  Neurologic: Patient is awake, alert and oriented ×3.  The patient's motor strength is 5 out of 5 and symmetric in the upper and lower extremities bilaterally  Extremities: No focal swelling or tenderness  Skin: No pallor, no redness or warmth to the touch      ED Course     Labs Reviewed   POCT ISTAT CHEM8 CARTRIDGE - Abnormal; Notable for the following components:       Result Value    ISTAT BUN <5 (*)     ISTAT Creatinine 0.40 (*)     All other components within normal limits   D-DIMER (POC) - Normal   ISTAT TROPONIN - Normal   POCT CBC     EKG    Rate, intervals and axes as noted on EKG Report.  Rate: 74  Rhythm: Sinus Rhythm  Reading: Patient's EKG demonstrates a sinus rhythm with a rate of 74.  The patient's axis and intervals are normal.  There is no acute ST elevation.           The patient's x-ray result was independently reviewed by myself.  There is no infiltrate or acute intrathoracic process noted.   Patient's lab results including normal CBC/electrolytes, negative troponin/D-dimer were reviewed with the patient.  Patient will try short course of steroids for possible intermittent bronchospasm.  The risk and benefits of antibiotics were discussed with the patient.  Possibility of pertussis was reviewed.  The patient prefers treatment.  Will initiate  Zithromax.  Will provide with pulmonary follow-up.     Pulse ox is 100% on room air, normal.  Vital signs are stable         MDM      shortness of breath including pulmonary infectious process, COPD, asthma, pulmonary embolus and congestive heart failure                                     Medical Decision Making      Disposition and Plan     Clinical Impression:  1. Persistent cough for 3 weeks or longer         Disposition:  Discharge  3/12/2024  2:55 pm    Follow-up:  Deshaun Villatoro DO  [address] [facility] [address], [address] [place] [address]  [phone]      Call for an appointment          Medications Prescribed:  Current Discharge Medication List        START taking these medications    Details   !! albuterol 108 (90 Base) MCG/ACT Inhalation Aero Soln Inhale 2 puffs into the lungs every 4 (four) hours as needed for Wheezing.  Qty: 1 each, Refills: 0      predniSONE 20 MG Oral Tab Take 2 tablets (40 mg total) by mouth daily for 5 days.  Qty: 10 tablet, Refills: 0      azithromycin (ZITHROMAX Z-SAGE) 250 MG Oral Tab 500 mg once followed by 250 mg daily x 4 days  Qty: 6 tablet, Refills: 0       !! - Potential duplicate medications found. Please discuss with provider.

## 2024-03-12 NOTE — ED INITIAL ASSESSMENT (HPI)
Patient arrives ambulatory with c/o cough x 1 month. Patient reports coughing attacks and that she will have chest pressure and shortness of breath during coughing attack. No chest pressure/shortness of breath at rest.

## 2024-05-13 ENCOUNTER — LAB ENCOUNTER (OUTPATIENT)
Dept: LAB | Facility: HOSPITAL | Age: 29
End: 2024-05-13
Attending: OBSTETRICS & GYNECOLOGY

## 2024-05-13 DIAGNOSIS — N92.6 MISSED MENSES: ICD-10-CM

## 2024-05-13 LAB
B-HCG SERPL-ACNC: 1072.3 MIU/ML
PROGEST SERPL-MCNC: 17.39 NG/ML

## 2024-05-13 PROCEDURE — 84702 CHORIONIC GONADOTROPIN TEST: CPT

## 2024-05-13 PROCEDURE — 84144 ASSAY OF PROGESTERONE: CPT

## 2024-05-13 PROCEDURE — 36415 COLL VENOUS BLD VENIPUNCTURE: CPT

## 2024-05-15 ENCOUNTER — LAB ENCOUNTER (OUTPATIENT)
Dept: LAB | Facility: HOSPITAL | Age: 29
End: 2024-05-15
Attending: OBSTETRICS & GYNECOLOGY

## 2024-05-15 DIAGNOSIS — N92.6 MISSED MENSES: ICD-10-CM

## 2024-05-15 LAB
B-HCG SERPL-ACNC: 2760.4 MIU/ML
PROGEST SERPL-MCNC: 15.93 NG/ML

## 2024-05-15 PROCEDURE — 84702 CHORIONIC GONADOTROPIN TEST: CPT

## 2024-05-15 PROCEDURE — 36415 COLL VENOUS BLD VENIPUNCTURE: CPT

## 2024-05-15 PROCEDURE — 84144 ASSAY OF PROGESTERONE: CPT

## 2024-05-16 ENCOUNTER — TELEPHONE (OUTPATIENT)
Dept: OBGYN CLINIC | Facility: CLINIC | Age: 29
End: 2024-05-16

## 2024-05-16 DIAGNOSIS — Z34.90 EARLY STAGE OF PREGNANCY (HCC): Primary | ICD-10-CM

## 2024-05-16 NOTE — TELEPHONE ENCOUNTER
Tried to call patient to go over results and recommendations by . MyChart sent as call dropped. Needs to schedule US. Order placed in file

## 2024-05-24 ENCOUNTER — HOSPITAL ENCOUNTER (OUTPATIENT)
Dept: ULTRASOUND IMAGING | Age: 29
Discharge: HOME | End: 2024-05-24
Attending: OBSTETRICS & GYNECOLOGY

## 2024-05-24 DIAGNOSIS — Z34.90 EARLY STAGE OF PREGNANCY (HCC): ICD-10-CM

## 2024-05-24 PROCEDURE — 76817 TRANSVAGINAL US OBSTETRIC: CPT | Performed by: OBSTETRICS & GYNECOLOGY

## 2024-05-24 PROCEDURE — 76801 OB US < 14 WKS SINGLE FETUS: CPT | Performed by: OBSTETRICS & GYNECOLOGY

## 2024-06-11 ENCOUNTER — OFFICE VISIT (OUTPATIENT)
Dept: OBGYN CLINIC | Facility: CLINIC | Age: 29
End: 2024-06-11

## 2024-06-11 VITALS
HEIGHT: 59 IN | WEIGHT: 124 LBS | SYSTOLIC BLOOD PRESSURE: 110 MMHG | DIASTOLIC BLOOD PRESSURE: 62 MMHG | BODY MASS INDEX: 25 KG/M2

## 2024-06-11 DIAGNOSIS — O02.1 MISSED ABORTION (HCC): Primary | ICD-10-CM

## 2024-06-11 PROCEDURE — 99214 OFFICE O/P EST MOD 30 MIN: CPT | Performed by: OBSTETRICS & GYNECOLOGY

## 2024-06-11 PROCEDURE — 76830 TRANSVAGINAL US NON-OB: CPT | Performed by: OBSTETRICS & GYNECOLOGY

## 2024-06-11 NOTE — PROGRESS NOTES
Chief Complaint   Patient presents with    Ultrasound         Marixa Rodriguez is a 28 year old female who presents for ultrasound. Viability of pregnancy    LMP: 2024.    Menses regular: yes.    Menstrual flow normal: yes.    Birth control or HRT: 0.   Refill 0  Last Pap Smear: 2022 . Any history of abnormal paps: No hx abn   Gardasil:(age 9-44 y/o) up to date.   Any medication refills needed today?: no    Problems/concerns: viability of pregnancy.      Next Appt: n/a        Immunization History   Administered Date(s) Administered    Covid-19 Vaccine Pfizer 30 mcg/0.3 ml 2021, 2021    FLUZONE 6 months and older PFS 0.5 ml (08893) 10/25/2022    Hpv Virus Vaccine 9 [facility] Im 2021, 2021, 2022    Influenza 10/25/2019, 10/27/2021    TDAP 2019         Current Outpatient Medications:     benzonatate 200 MG Oral Cap, Take 1 capsule (200 mg total) by mouth 3 (three) times daily as needed for cough., Disp: 15 capsule, Rfl: 0    acetaminophen 500 MG Oral Tab, Take 1 tablet (500 mg total) by mouth every 6 (six) hours as needed for Pain., Disp: , Rfl:     VYVANSE 30 MG Oral Cap, Take 1 capsule (30 mg total) by mouth every morning., Disp: , Rfl:     Allergies   Allergen Reactions    Sulfa Antibiotics HIVES       OB History    Para Term  AB Living   2 0 0 0 2 0   SAB IAB Ectopic Multiple Live Births   1 1 0 0 0      # Outcome Date GA Lbr Jair/2nd Weight Sex Type Anes PTL Lv   2 IAB            1 SAB                No past medical history on file.    Past Surgical History:   Procedure Laterality Date    [place] teeth removed             Family History   Problem Relation Age of Onset    Diabetes Maternal Grandmother     Diabetes Maternal Grandfather         Allergies         Social History     Socioeconomic History    Marital status: Single     Spouse name: Not on file    Number of children: Not on file    Years of education: Not on file    Highest education level:  Not on file   Occupational History    Not on file   Tobacco Use    Smoking status: Some Days    Smokeless tobacco: Current   Substance and Sexual Activity    Alcohol use: Yes     Comment: Occ.     Drug use: Never    Sexual activity: Not on file   Other Topics Concern    Not on file   Social History Narrative    Not on file     Social Determinants of Health     Financial Resource Strain: Not on file   Food Insecurity: Not on file   Transportation Needs: Not on file   Physical Activity: Not on file   Stress: Not on file   Social Connections: Unknown (3/9/2021)    [facility]    Social Connections     Conversations with friends/family/neighbors per week: Not on file   Housing Stability: Low Risk  (7/7/2021)    [facility], [facility]    Housing Stability     Mortgage Payment Concerns?: Not on file     Number of Places Lived in the Last Year: Not on file     Unstable Housing?: Not on file     /62   Ht 4' 11\" (1.499 m)   Wt 124 lb (56.2 kg)   LMP 02/22/2024 (Approximate)     Wt Readings from Last 3 Encounters:   06/11/24 124 lb (56.2 kg)   02/19/24 120 lb (54.4 kg)   12/05/23 125 lb (56.7 kg)       Health Maintenance   Topic Date Due    Annual Physical  Never done    HPV Vaccines Ages 27-45 (2 - 3-dose series) 02/23/2022    COVID-19 Vaccine (3 - 2023-24 season) 09/01/2023    Annual Depression Screening  Never done    Tobacco Cessation Counseling  Never done    Influenza Vaccine (Season Ended) 10/01/2024    Pap Smear  07/18/2026    DTaP,Tdap,and Td Vaccines (2 - Td or Tdap) 11/14/2029    Pneumococcal Vaccine: Birth to 64yrs  Aged Out         Review of Systems   General: Present- Feeling well. Not Present- Fever.  Female Genitourinary: Not Present- Dysmenorrhea, Dyspareunia, Flank Pain, Frequency, Menstrual Irregularities, Pelvic Pain, Urgency, Urinary Complaints, Vaginal Bleeding and Vaginal  dryness.  Pain: Present- Pain Rating - 0 on a 0-10 scale.  All other systems negative       Physical Exam   The physical exam findings are as follows:     Abdomen   Inspection: - Inspection Normal.  Palpation/Percussion: Palpation and Percussion of the abdomen reveal - Non Tender, No hepatosplenomegaly and No Palpable abdominal masses.    Female Genitourinary     External Genitalia   Perineum - Normal. Bartholin's Gland - Bilateral - Normal. Clitoris - Normal.  Introitus: Characteristics - Normal. Discharge - None.  Labia Majora: Lesions - Bilateral - None. Characteristics - Bilateral - Normal.  Labia Minora: Lesions - Bilateral - None. Characteristics - Bilateral - Normal.  Urethra: Characteristics - Normal. Discharge - None.  Kicking Horse Gland - Bilateral - Normal.  Vulva: Characteristics - Normal. Lesions - None.    Speculum & Bimanual   Vagina:   Vaginal Wall: - Normal.  Vaginal Lesions - None. Vaginal Mucosa - Normal.  Cervix: Characteristics - No Motion tenderness. Discharge - None.  Uterus: Characteristics - Non Tender. Position - Midposition.  Adnexa: Characteristics - Bilateral - Tender. Masses - No Adnexal Masses.  Bladder - Normal.    Rectal   Anorectal Exam: External - normal external exam.    Lymphatic  General Lymphatics   Description - Normal .      Location: Transabdominal  Transvaginal x    OB Data: Fetus/Gestational Sac# 1/1      Placenta Location/Grade       EGA Dates 7w5d by 5 week ultrasound       U/S EGA no change from prior ultrasound but fetal pole appears present, no color flow no fht       ERIKA adq      EFW crl = too small to measure      Uterus wnls Y/N y      Adnexa wnls Y/N y      FHR absent       BPP     Impression: Non-viable IUP    MISCARRIAGE TREATMENT:  MEDICATION   HOW DOES IT WORK:  A pill called Misoprostol (also known as Cytotec) can help an early miscarriage to end faster.   HOW WELL DOES THE MEDICATION WORK?  Misoprostol pills work 80-90% of the time.   IS IT SAFE?  Yes, using pills  is a safe way to complete a miscarriage. Using pills does not lower your change of getting pregnant again.   WHAT DO I DO?  Choose a good time to use the pills.  You can use the pills in the day or night.  Choose a time when you have had a good meal and plenty of rest.  Swallow Ibuprofen (800 mg total) 1 hour before you take the Misoprostol. This will help decrease your cramps.   Wash your hands and lie down. Place 4 Misoprostol pills in your vagina and repeat with 4 tablets in 3 hours.  Each Misoprostol pill contains 200 micrograms.  Continue to lie down for 30 minutes after each placement.  After lying down for 30 minutes, you can move around as usual. If the tablets come out after 30 minutes, it is OK. Your body has absorbed the medication.     WHAT ARE THE SIDE EFFECTS?  Cramping and bleeding with clots: The cramps and bleeding may be much more thatn you get with a period. The cramps usually start 2-4 hours after you insert the pills and may last 3-5 hours.  The heavy bleeding is not risky.  It means the treatment is working.  The bleeding often last 1-2 weeks, and may stop and start a few times.   Nausea, diarrhea, and chills:  These symptoms should get better a few hours after using the pills.  WHAT CAN I TAKE FOR PAIN?  Medication:  Take Ibuprofen (Motrin or Advil) 800 mg every 8 hours as needed for pain.  Take this with food to avoid an upset stomach.  It's okay to combine ibuprofen and acetominophen(Tylenol).  Comfort:  A hot pack or ice pack may help with cramps.  You can also drink some hot tea.  Rest in a soothing place.  WHAT IF IT TAKES TOO LONG OR DOESN'T WORK?  If it doesn't work or you feel it is taking too long, you can return for a procedure or try the pills again.   WHEN SHOULD I CALL OR RETURN TO MY PROVIDER?  If the bleeding becomes very heavy and soaks through more than 2 maxi pads per hour for 2 hours in a row.  If you start to feel very ill  If you have a fever over 101 F.  Let your  providers know if you need help.  No question is too small.  Your provider knows this may be a tough time for you and you have received a lot of instructions.  If you forget something, call your provider.  Keep a follow up appointment with your provider.    HOW SOON CAN I GET PREGNANT AGAIN?  You can get pregnant soon after this miscarriage is over.  We recommend that you wait 2-3  cycles before trying again but continue to take your prenatal vitamin.  If you are not ready to be pregnant at this time, please discuss birth control options with your provider.    FEELINGS AFTER MISCARRIAGE  Please understand that nothing you did caused the miscarriage.  A miscarriage is nature's way of ending a pregnancy that would not be healthy.  Miscarriage in NOT caused by stree, sports, food, or sex.  Sometimes families and friends don't know what to say.  If you feel very upset, you may want to talk to a counselor.        1. Missed  (HCC)      Plan for cytotec then repeat hcg weekly till <1

## 2024-06-12 ENCOUNTER — PATIENT MESSAGE (OUTPATIENT)
Dept: OBGYN CLINIC | Facility: CLINIC | Age: 29
End: 2024-06-12

## 2024-06-12 ENCOUNTER — LAB ENCOUNTER (OUTPATIENT)
Dept: LAB | Age: 29
End: 2024-06-12
Attending: OBSTETRICS & GYNECOLOGY
Payer: COMMERCIAL

## 2024-06-12 DIAGNOSIS — O02.1 MISSED ABORTION (HCC): ICD-10-CM

## 2024-06-12 LAB
B-HCG SERPL-ACNC: ABNORMAL MIU/ML
RH BLOOD TYPE: POSITIVE

## 2024-06-12 PROCEDURE — 84702 CHORIONIC GONADOTROPIN TEST: CPT

## 2024-06-12 PROCEDURE — 86901 BLOOD TYPING SEROLOGIC RH(D): CPT

## 2024-06-12 PROCEDURE — 86900 BLOOD TYPING SEROLOGIC ABO: CPT

## 2024-06-12 PROCEDURE — 36415 COLL VENOUS BLD VENIPUNCTURE: CPT

## 2024-06-13 NOTE — TELEPHONE ENCOUNTER
From: Marixa Rodriguez  To: Angeles Dominique  Sent: 6/12/2024 5:14 PM CDT  Subject: Work note     Good afternoon,   Was wondering if I may be provided with a work note? Saw Dr. Dominique and was planning on taking the medication provided soon.   Thank you in advance

## 2024-06-17 ENCOUNTER — TELEPHONE (OUTPATIENT)
Dept: OBGYN CLINIC | Facility: CLINIC | Age: 29
End: 2024-06-17

## 2024-06-17 DIAGNOSIS — O02.1 MISSED ABORTION (HCC): Primary | ICD-10-CM

## 2024-06-17 NOTE — TELEPHONE ENCOUNTER
Patient recently miscarried. She is calling to update that her bleeding is light and to request orders for needed lab work. Please advise.

## 2024-06-19 ENCOUNTER — LAB ENCOUNTER (OUTPATIENT)
Dept: LAB | Age: 29
End: 2024-06-19
Attending: OBSTETRICS & GYNECOLOGY

## 2024-06-19 DIAGNOSIS — O02.1 MISSED ABORTION (HCC): ICD-10-CM

## 2024-06-19 LAB — B-HCG SERPL-ACNC: 773 MIU/ML

## 2024-06-19 PROCEDURE — 36415 COLL VENOUS BLD VENIPUNCTURE: CPT

## 2024-06-19 PROCEDURE — 84702 CHORIONIC GONADOTROPIN TEST: CPT

## 2024-06-20 ENCOUNTER — TELEPHONE (OUTPATIENT)
Dept: OBGYN UNIT | Facility: HOSPITAL | Age: 29
End: 2024-06-20

## 2024-06-21 ENCOUNTER — TELEPHONE (OUTPATIENT)
Dept: OBGYN CLINIC | Facility: CLINIC | Age: 29
End: 2024-06-21

## 2024-06-21 DIAGNOSIS — O02.1 MISSED ABORTION (HCC): Primary | ICD-10-CM

## 2024-06-21 NOTE — TELEPHONE ENCOUNTER
Name and Date of Birth verified    Patient given Dr. Dominique's results and recommendations and voiced understanding. Order for standing HCG levels placed.

## 2024-06-21 NOTE — TELEPHONE ENCOUNTER
----- Message from Angeles Dominique sent at 6/20/2024  1:39 PM CDT -----    Dropping HCG -  Have patient repeat HCG weekly till < 2.    Angeles Dominique MD

## 2024-06-26 ENCOUNTER — LAB ENCOUNTER (OUTPATIENT)
Dept: LAB | Facility: HOSPITAL | Age: 29
End: 2024-06-26
Attending: OBSTETRICS & GYNECOLOGY

## 2024-06-26 DIAGNOSIS — O02.1 MISSED ABORTION (HCC): ICD-10-CM

## 2024-06-26 LAB — B-HCG SERPL-ACNC: 134 MIU/ML

## 2024-06-26 PROCEDURE — 84702 CHORIONIC GONADOTROPIN TEST: CPT

## 2024-06-26 PROCEDURE — 36415 COLL VENOUS BLD VENIPUNCTURE: CPT

## 2024-07-03 ENCOUNTER — LAB ENCOUNTER (OUTPATIENT)
Dept: LAB | Age: 29
End: 2024-07-03
Attending: OBSTETRICS & GYNECOLOGY
Payer: COMMERCIAL

## 2024-07-03 DIAGNOSIS — O02.1 MISSED ABORTION (HCC): ICD-10-CM

## 2024-07-03 LAB — B-HCG SERPL-ACNC: 36 MIU/ML

## 2024-07-03 PROCEDURE — 36415 COLL VENOUS BLD VENIPUNCTURE: CPT

## 2024-07-03 PROCEDURE — 84702 CHORIONIC GONADOTROPIN TEST: CPT

## 2024-07-08 ENCOUNTER — TELEPHONE (OUTPATIENT)
Dept: OBGYN CLINIC | Facility: CLINIC | Age: 29
End: 2024-07-08

## 2024-07-08 NOTE — TELEPHONE ENCOUNTER
----- Message from Angeles Dominique sent at 7/6/2024 12:42 PM CDT -----    Dropping HCG -  Have patient repeat HCG weekly till < 2.    Angeles Dominique MD

## 2024-07-10 ENCOUNTER — LAB ENCOUNTER (OUTPATIENT)
Dept: LAB | Age: 29
End: 2024-07-10
Attending: OBSTETRICS & GYNECOLOGY
Payer: COMMERCIAL

## 2024-07-10 DIAGNOSIS — O02.1 MISSED ABORTION (HCC): ICD-10-CM

## 2024-07-10 LAB — B-HCG SERPL-ACNC: 17 MIU/ML

## 2024-07-10 PROCEDURE — 36415 COLL VENOUS BLD VENIPUNCTURE: CPT

## 2024-07-10 PROCEDURE — 84702 CHORIONIC GONADOTROPIN TEST: CPT

## 2024-09-09 ENCOUNTER — LAB ENCOUNTER (OUTPATIENT)
Dept: LAB | Age: 29
End: 2024-09-09
Attending: OBSTETRICS & GYNECOLOGY
Payer: COMMERCIAL

## 2024-09-09 DIAGNOSIS — N92.6 MISSED MENSES: ICD-10-CM

## 2024-09-09 LAB
B-HCG SERPL-ACNC: 642.8 MIU/ML
PROGEST SERPL-MCNC: 15.6 NG/ML

## 2024-09-09 PROCEDURE — 84702 CHORIONIC GONADOTROPIN TEST: CPT

## 2024-09-09 PROCEDURE — 36415 COLL VENOUS BLD VENIPUNCTURE: CPT

## 2024-09-09 PROCEDURE — 84144 ASSAY OF PROGESTERONE: CPT

## 2024-09-11 ENCOUNTER — LAB ENCOUNTER (OUTPATIENT)
Dept: LAB | Age: 29
End: 2024-09-11
Attending: OBSTETRICS & GYNECOLOGY
Payer: COMMERCIAL

## 2024-09-11 DIAGNOSIS — N92.6 MISSED MENSES: ICD-10-CM

## 2024-09-11 LAB
B-HCG SERPL-ACNC: 958.2 MIU/ML
PROGEST SERPL-MCNC: 11.29 NG/ML

## 2024-09-11 PROCEDURE — 84144 ASSAY OF PROGESTERONE: CPT

## 2024-09-11 PROCEDURE — 84702 CHORIONIC GONADOTROPIN TEST: CPT

## 2024-09-11 PROCEDURE — 36415 COLL VENOUS BLD VENIPUNCTURE: CPT

## 2024-09-13 ENCOUNTER — TELEPHONE (OUTPATIENT)
Dept: OBGYN CLINIC | Facility: CLINIC | Age: 29
End: 2024-09-13

## 2024-09-13 DIAGNOSIS — N92.6 MISSED MENSES: Primary | ICD-10-CM

## 2024-09-13 NOTE — PROGRESS NOTES
History of a miscarriage and hCG did not double would repeat it in 3 days on 14 September and give precautions for miscarriage.   Angeles Dominique MD

## 2024-09-13 NOTE — TELEPHONE ENCOUNTER
Patient had a missed call from our office and returning call, please call at [phone],thanks.

## 2024-09-13 NOTE — TELEPHONE ENCOUNTER
Patient verified name and     Patient informed of recommendations, patient states she will not be able to complete HCG until Monday. Advised we might need to repeat second lab set if necessary 48 hrs later. Patient in agreement to plan. Precautions provided.

## 2024-09-16 ENCOUNTER — LAB ENCOUNTER (OUTPATIENT)
Dept: LAB | Age: 29
End: 2024-09-16
Attending: OBSTETRICS & GYNECOLOGY
Payer: COMMERCIAL

## 2024-09-16 DIAGNOSIS — N92.6 MISSED MENSES: ICD-10-CM

## 2024-09-16 LAB
B-HCG SERPL-ACNC: 992.9 MIU/ML
PROGEST SERPL-MCNC: 6.32 NG/ML

## 2024-09-16 PROCEDURE — 84144 ASSAY OF PROGESTERONE: CPT

## 2024-09-16 PROCEDURE — 84702 CHORIONIC GONADOTROPIN TEST: CPT

## 2024-09-16 PROCEDURE — 36415 COLL VENOUS BLD VENIPUNCTURE: CPT

## 2024-09-17 NOTE — PROGRESS NOTES
looks like a non-viable pregnancy - order stat ultrasound and give precautions for SAB. Rh+   Please call me on my cell if you have questions about this patient. 2 prior SABs.     Angeles Dominique MD

## 2024-09-18 ENCOUNTER — TELEPHONE (OUTPATIENT)
Dept: OBGYN CLINIC | Facility: CLINIC | Age: 29
End: 2024-09-18

## 2024-09-18 DIAGNOSIS — N92.6 MISSED MENSES: Primary | ICD-10-CM

## 2024-09-18 NOTE — TELEPHONE ENCOUNTER
See result notes.    Patient informed of results and recommendations. Order for stat ultrasound placed. Patient will call for scheduling. Patient voiced understanding and agreed.

## 2024-09-27 ENCOUNTER — TELEPHONE (OUTPATIENT)
Dept: OBGYN CLINIC | Facility: CLINIC | Age: 29
End: 2024-09-27

## 2024-09-27 DIAGNOSIS — O20.9 BLEEDING IN EARLY PREGNANCY (HCC): Primary | ICD-10-CM

## 2024-09-27 NOTE — TELEPHONE ENCOUNTER
Patient verified name and     Patient with prior history of miscarriage, reports she started spotting last night. Today bleeding is heavier with menstrual like cramping. Patient states that she believes it's a miscarriage. HCG's performed were not trending appropriately. Stat ultrasound was scheduled for  and patient did not proceed with appointment. Discussed recommendations for further evaluation at the emergency room or checking lab work. Patient would like HCG placed. Order placed. Ectopic precautions given.

## 2024-09-28 ENCOUNTER — LAB ENCOUNTER (OUTPATIENT)
Dept: LAB | Facility: HOSPITAL | Age: 29
End: 2024-09-28
Attending: OBSTETRICS & GYNECOLOGY
Payer: COMMERCIAL

## 2024-09-28 DIAGNOSIS — O20.9 BLEEDING IN EARLY PREGNANCY (HCC): ICD-10-CM

## 2024-09-28 LAB
B-HCG SERPL-ACNC: 6 MIU/ML
PROGEST SERPL-MCNC: 0.58 NG/ML
RH BLOOD TYPE: POSITIVE

## 2024-09-28 PROCEDURE — 86900 BLOOD TYPING SEROLOGIC ABO: CPT

## 2024-09-28 PROCEDURE — 36415 COLL VENOUS BLD VENIPUNCTURE: CPT

## 2024-09-28 PROCEDURE — 84144 ASSAY OF PROGESTERONE: CPT

## 2024-09-28 PROCEDURE — 86901 BLOOD TYPING SEROLOGIC RH(D): CPT

## 2024-09-28 PROCEDURE — 84702 CHORIONIC GONADOTROPIN TEST: CPT

## 2024-09-29 ENCOUNTER — TELEPHONE (OUTPATIENT)
Dept: OBGYN CLINIC | Facility: CLINIC | Age: 29
End: 2024-09-29

## 2024-10-01 ENCOUNTER — TELEPHONE (OUTPATIENT)
Dept: OBGYN CLINIC | Facility: CLINIC | Age: 29
End: 2024-10-01

## 2024-10-01 NOTE — TELEPHONE ENCOUNTER
Patient called. Requesting a note  stating patient to return to work 10/4/24 due to miscarriage. Please send note to [employer].

## 2024-11-14 ENCOUNTER — OFFICE VISIT (OUTPATIENT)
Dept: FAMILY MEDICINE CLINIC | Facility: CLINIC | Age: 29
End: 2024-11-14
Payer: COMMERCIAL

## 2024-11-14 VITALS
BODY MASS INDEX: 23.79 KG/M2 | RESPIRATION RATE: 16 BRPM | SYSTOLIC BLOOD PRESSURE: 100 MMHG | TEMPERATURE: 99 F | DIASTOLIC BLOOD PRESSURE: 60 MMHG | HEART RATE: 92 BPM | OXYGEN SATURATION: 98 % | HEIGHT: 59 IN | WEIGHT: 118 LBS

## 2024-11-14 DIAGNOSIS — F90.0 ADHD, PREDOMINANTLY INATTENTIVE TYPE: ICD-10-CM

## 2024-11-14 DIAGNOSIS — Z11.3 SCREEN FOR SEXUALLY TRANSMITTED DISEASES: ICD-10-CM

## 2024-11-14 DIAGNOSIS — Z86.19 HISTORY OF PCR DNA POSITIVE FOR HSV1: ICD-10-CM

## 2024-11-14 DIAGNOSIS — Z23 NEED FOR VACCINATION: ICD-10-CM

## 2024-11-14 DIAGNOSIS — Z00.01 ENCOUNTER FOR GENERAL ADULT MEDICAL EXAMINATION WITH ABNORMAL FINDINGS: ICD-10-CM

## 2024-11-14 PROBLEM — A60.04 HERPES SIMPLEX VIRUS (HSV) INFECTION OF VAGINA: Status: ACTIVE | Noted: 2024-11-14

## 2024-11-14 PROCEDURE — 87591 N.GONORRHOEAE DNA AMP PROB: CPT | Performed by: FAMILY MEDICINE

## 2024-11-14 PROCEDURE — 81514 NFCT DS BV&VAGINITIS DNA ALG: CPT | Performed by: FAMILY MEDICINE

## 2024-11-14 PROCEDURE — 87491 CHLMYD TRACH DNA AMP PROBE: CPT | Performed by: FAMILY MEDICINE

## 2024-11-14 RX ORDER — LISDEXAMFETAMINE DIMESYLATE 40 MG/1
40 CAPSULE ORAL DAILY
Qty: 30 CAPSULE | Refills: 0 | Status: SHIPPED | OUTPATIENT
Start: 2025-01-15 | End: 2025-02-14

## 2024-11-14 RX ORDER — LISDEXAMFETAMINE DIMESYLATE 40 MG/1
40 CAPSULE ORAL DAILY
Qty: 30 CAPSULE | Refills: 0 | Status: SHIPPED | OUTPATIENT
Start: 2024-12-15 | End: 2025-01-14

## 2024-11-14 RX ORDER — NAPROXEN 500 MG/1
500 TABLET ORAL
COMMUNITY
Start: 2024-11-08

## 2024-11-14 RX ORDER — VALACYCLOVIR HYDROCHLORIDE 1 G/1
TABLET, FILM COATED ORAL
COMMUNITY
Start: 2024-10-26 | End: 2024-11-14

## 2024-11-14 RX ORDER — VALACYCLOVIR HYDROCHLORIDE 1 G/1
TABLET, FILM COATED ORAL
Qty: 30 TABLET | Refills: 3 | Status: SHIPPED | OUTPATIENT
Start: 2024-11-14

## 2024-11-14 RX ORDER — CLOBETASOL PROPIONATE 0.5 MG/G
1 CREAM TOPICAL 2 TIMES DAILY
COMMUNITY
Start: 2023-12-19

## 2024-11-14 RX ORDER — CYCLOBENZAPRINE HCL 10 MG
10 TABLET ORAL 3 TIMES DAILY PRN
COMMUNITY
Start: 2024-11-08

## 2024-11-14 RX ORDER — LISDEXAMFETAMINE DIMESYLATE 40 MG/1
40 CAPSULE ORAL DAILY
Qty: 30 CAPSULE | Refills: 0 | Status: SHIPPED | OUTPATIENT
Start: 2024-11-14 | End: 2024-12-14

## 2024-11-14 NOTE — PROGRESS NOTES
Family Medicine Progress Note  Assessment & Plan:   Marixa Rodriguez is a 29 year old female who is here for:     1. Encounter for general adult medical examination with abnormal findings  Wellness Exam done today and routine Preventative Care discussed as noted below.   -Pap smear: 07/18/2023   up-to-date    -G&C testing: ordered  -Immunizations:  declined influenza   -Routine labs ordered.   -Healthy eating habits and regular exercise encouraged   - CBC With Differential With Platelet; Future  - Comp Metabolic Panel (14); Future  - TSH W Reflex To Free T4; Future  - Lipid Panel; Future    2. ADHD, predominantly inattentive type- Stable BP/weight on current meds;  No Adverse side effects.  Desires to continue current RX therapy.   -Contin Vyvanse 40mg   -Aware of behavior modifications  -Aware of safe-handling of controlled substance  -F/U 3 mos    - lisdexamfetamine (VYVANSE) 40 MG Oral Cap; Take 1 capsule (40 mg total) by mouth daily.  Dispense: 30 capsule; Refill: 0  - lisdexamfetamine (VYVANSE) 40 MG Oral Cap; Take 1 capsule (40 mg total) by mouth daily.  Dispense: 30 capsule; Refill: 0  - lisdexamfetamine (VYVANSE) 40 MG Oral Cap; Take 1 capsule (40 mg total) by mouth daily.  Dispense: 30 capsule; Refill: 0    3. Screen for sexually transmitted diseases-=  - T Pallidum Screening [place]; Future  - HIV AG AB Combo (Consent Obtained prechecked); Future  - Urine Chlamydia/GC Amplification; Future  - Hepatitis B Surface Antibody; Future  - Hepatitis B Surface Antigen; Future  - HCV Antibody; Future  - Urine Chlamydia/GC Amplification  - Vaginitis Vaginosis PCR Panel; Future  - Vaginitis Vaginosis PCR Panel    4. History of PCR DNA positive for HSV1-  Valtrex given to have on hand   - valACYclovir (VALTREX) 1 G Oral Tab; 1 TAB Q12H x 3 days at first onset of cold sore symptoms;  Dispense: 30 tablet; Refill: 3    5. Need for vaccination  Immunizations discussed;  Specifically I discussed the purpose, benefit,  adverse reactions and side effects of the following vaccinations:    - Human Papillomavirus 9-valent vaccine, Recombinant (Gardasil 9) HPV 9 [44895]             Follow-Up: Return in about 3 months (around 2025) for F/U ADHD, VV vega.      Hoda Watson, DO   24      CC: Establish Care and ADHD (Medication follow up. )    Subjective:    History of Present Illness:  History obtained from patient.     Marixa Rodriguez is a 29 year old female who presents for Establish Care and ADHD (Medication follow up. )     ANNUAL PHYSICAL  Menses: Regular without any break through bleeding or concerning symptoms.   LMP: Patient's last menstrual period was 10/19/2024 (approximate).  Concern for STI: would like testing.   Contraception:  None   Cervical Cancer Screening-  up-to-date    PMH of Abnormal Pap: -  next year was normal.    Exercise: generally tries to be active--- Stairmaster and weights   Healthy eating habits:  Well-rounded--- healthy   Tobacco: Former; No vaping.    Immunizations: HPV.      ADHD-   Diagnosed about 5-6 yrs ago.  Has only been on Vyvanse and its been the most effective.   Meds:  Vyvanse 30mg   Wear-off symptoms (inattentiveness, hyperactivity, more emotional)? Denies   Side effects (decreased appetite, headache, stomach ache, sleep problems, tics)? Denies       ADHD- Vyvanse   Hsv- 1st time   Pshx: WTE   All: Sulfa  Fam hx: DM-MGM, MGF   Soc hx: EEH- Reimbursement Coding.    Ob/gyne: Patient's last menstrual period was 10/19/2024 (approximate).. 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       cyclobenzaprine 10 MG Oral Tab Take 1 tablet (10 mg total) by mouth 3 (three) times daily as needed.      naproxen 500 MG Oral Tab Take 1 tablet (500 mg total) by mouth.      lisdexamfetamine (VYVANSE) 40 MG Oral Cap Take 1 capsule (40 mg total) by mouth daily. 30 capsule 0    [START ON 12/15/2024] lisdexamfetamine (VYVANSE) 40 MG Oral Cap Take 1 capsule (40 mg total) by mouth daily. 30 capsule 0    [START ON 1/15/2025] lisdexamfetamine (VYVANSE) 40 MG Oral Cap Take 1 capsule (40 mg total) by mouth daily. 30 capsule 0    valACYclovir (VALTREX) 1 G Oral Tab 1 TAB Q12H x 3 days at first onset of cold sore symptoms; 30 tablet 3      Past Medical History:  History reviewed. No pertinent past medical history.   Past Surgical History:  Past Surgical History:   Procedure Laterality Date    [place] teeth removed      2019      Family History:  Family History   Problem Relation Age of Onset    Diabetes Maternal Grandmother     Diabetes Maternal Grandfather       Social History:  Social History     Socioeconomic History    Marital status: Single   Tobacco Use    Smoking status: Former     Types: Cigarettes     Start date: 7/17/2013    Smokeless tobacco: Never   Vaping Use    Vaping status: Never Used   Substance and Sexual Activity    Alcohol use: Yes     Comment: Occ.     Drug use: Never    Sexual activity: Yes     Partners: Male   Other Topics Concern    Caffeine Concern Yes    Exercise Yes    Seat Belt Yes    Special Diet No    Stress Concern No    Weight Concern No     Social Drivers of Health      Received from [facility], [facility]    Social Connections    Received from [facility], [facility]    Housing Stability       Allergies:  Allergies[1]     Objective:    VITALS: /60   Pulse 92   Temp 98.5 °F (36.9 °C) (Temporal)   Resp 16   Ht 4' 11\" (1.499 m)   Wt 118 lb (53.5 kg)   LMP 10/19/2024 (Approximate)   SpO2 98%   BMI 23.83  kg/m²      BP Readings from Last 3 Encounters:   11/14/24 100/60   06/11/24 110/62   03/12/24 112/66     Wt Readings from Last 3 Encounters:   11/14/24 118 lb (53.5 kg)   06/11/24 124 lb (56.2 kg)   02/19/24 120 lb (54.4 kg)         PHYSICAL EXAM  GEN: pleasant, well-appearing in NAD, AOX3  SKIN: no visible rashes, lesions, or evidence of trauma  HEENT: PERRL, EOMI, moist mucous membranes, oropharynx clear, TM clear, nares patent, no Thyromegaly or nodule.   CV: RRR, no murmurs or abnl heart sounds   PULM: Clear to auscultation, No wheezes, rales, rhonchi.  Non-labored breathing.  ABD: Soft, non-tender, non-distended, + BS, no rigidity/guarding  EXT:  Warm, well perfused, no lower extremity edema  NEURO: CNs grossly intact, no focal weakness  MSK: moves all 4 extremities without difficulty  PSYCH: mood and affect are appropriate          Hoda Watson DO    11/25/24 11:54 PM           Hoda Watson DO    NOTE TO PATIENT: The 21st Century Cures Act makes clinical notes like these available to patients in the interest of transparency. Clinical notes are medical documents used by physicians and care providers to communicate with each other. These documents include medical language and terminology, abbreviations, and treatment information that may sound technical and at times possibly unfamiliar. In addition, at times, the verbiage may appear blunt or direct. These documents are one tool providers use to communicate relevant information and clinical opinions of the care providers in a way that allows common understanding of the clinical context.           [1]   Allergies  Allergen Reactions    Sulfa Antibiotics HIVES

## 2024-11-15 LAB
C TRACH DNA SPEC QL NAA+PROBE: NEGATIVE
N GONORRHOEA DNA SPEC QL NAA+PROBE: NEGATIVE

## 2024-11-16 LAB
BV BACTERIA DNA VAG QL NAA+PROBE: NEGATIVE
C GLABRATA DNA VAG QL NAA+PROBE: NEGATIVE
C KRUSEI DNA VAG QL NAA+PROBE: NEGATIVE
CANDIDA DNA VAG QL NAA+PROBE: NEGATIVE
T VAGINALIS DNA VAG QL NAA+PROBE: NEGATIVE

## 2025-02-18 ENCOUNTER — PATIENT MESSAGE (OUTPATIENT)
Dept: FAMILY MEDICINE CLINIC | Facility: CLINIC | Age: 30
End: 2025-02-18

## 2025-02-18 DIAGNOSIS — Z00.01 ENCOUNTER FOR GENERAL ADULT MEDICAL EXAMINATION WITH ABNORMAL FINDINGS: Primary | ICD-10-CM

## 2025-02-18 DIAGNOSIS — Z11.3 SCREEN FOR SEXUALLY TRANSMITTED DISEASES: ICD-10-CM

## 2025-03-03 ENCOUNTER — LABORATORY ENCOUNTER (OUTPATIENT)
Dept: LAB | Age: 30
End: 2025-03-03
Attending: FAMILY MEDICINE
Payer: COMMERCIAL

## 2025-03-03 DIAGNOSIS — Z11.3 SCREEN FOR SEXUALLY TRANSMITTED DISEASES: ICD-10-CM

## 2025-03-03 DIAGNOSIS — Z00.01 ENCOUNTER FOR GENERAL ADULT MEDICAL EXAMINATION WITH ABNORMAL FINDINGS: ICD-10-CM

## 2025-03-03 LAB
ALBUMIN SERPL-MCNC: 4.6 G/DL (ref 3.2–4.8)
ALBUMIN/GLOB SERPL: 1.7 {RATIO} (ref 1–2)
ALP LIVER SERPL-CCNC: 82 U/L
ALT SERPL-CCNC: 10 U/L
ANION GAP SERPL CALC-SCNC: 0 MMOL/L (ref 0–18)
AST SERPL-CCNC: 18 U/L (ref ?–34)
BASOPHILS # BLD AUTO: 0.02 X10(3) UL (ref 0–0.2)
BASOPHILS NFR BLD AUTO: 0.3 %
BILIRUB SERPL-MCNC: 0.5 MG/DL (ref 0.3–1.2)
BUN BLD-MCNC: 7 MG/DL (ref 9–23)
CALCIUM BLD-MCNC: 9.5 MG/DL (ref 8.7–10.6)
CHLORIDE SERPL-SCNC: 106 MMOL/L (ref 98–112)
CHOLEST SERPL-MCNC: 161 MG/DL (ref ?–200)
CO2 SERPL-SCNC: 29 MMOL/L (ref 21–32)
CREAT BLD-MCNC: 0.62 MG/DL
EGFRCR SERPLBLD CKD-EPI 2021: 123 ML/MIN/1.73M2 (ref 60–?)
EOSINOPHIL # BLD AUTO: 0.03 X10(3) UL (ref 0–0.7)
EOSINOPHIL NFR BLD AUTO: 0.5 %
ERYTHROCYTE [DISTWIDTH] IN BLOOD BY AUTOMATED COUNT: 12.3 %
FASTING PATIENT LIPID ANSWER: NO
FASTING STATUS PATIENT QL REPORTED: NO
GLOBULIN PLAS-MCNC: 2.7 G/DL (ref 2–3.5)
GLUCOSE BLD-MCNC: 108 MG/DL (ref 70–99)
HBV SURFACE AB SER QL: REACTIVE
HBV SURFACE AB SERPL IA-ACNC: 642.54 MIU/ML
HBV SURFACE AG SER-ACNC: <0.1 [IU]/L
HBV SURFACE AG SERPL QL IA: NONREACTIVE
HCT VFR BLD AUTO: 40.9 %
HCV AB SERPL QL IA: NONREACTIVE
HDLC SERPL-MCNC: 73 MG/DL (ref 40–59)
HGB BLD-MCNC: 14.2 G/DL
IMM GRANULOCYTES # BLD AUTO: 0.03 X10(3) UL (ref 0–1)
IMM GRANULOCYTES NFR BLD: 0.5 %
LDLC SERPL CALC-MCNC: 74 MG/DL (ref ?–100)
LYMPHOCYTES # BLD AUTO: 1.64 X10(3) UL (ref 1–4)
LYMPHOCYTES NFR BLD AUTO: 27.6 %
MCH RBC QN AUTO: 31.5 PG (ref 26–34)
MCHC RBC AUTO-ENTMCNC: 34.7 G/DL (ref 31–37)
MCV RBC AUTO: 90.7 FL
MONOCYTES # BLD AUTO: 0.32 X10(3) UL (ref 0.1–1)
MONOCYTES NFR BLD AUTO: 5.4 %
NEUTROPHILS # BLD AUTO: 3.9 X10 (3) UL (ref 1.5–7.7)
NEUTROPHILS # BLD AUTO: 3.9 X10(3) UL (ref 1.5–7.7)
NEUTROPHILS NFR BLD AUTO: 65.7 %
NONHDLC SERPL-MCNC: 88 MG/DL (ref ?–130)
OSMOLALITY SERPL CALC.SUM OF ELEC: 279 MOSM/KG (ref 275–295)
PLATELET # BLD AUTO: 305 10(3)UL (ref 150–450)
POTASSIUM SERPL-SCNC: 3.6 MMOL/L (ref 3.5–5.1)
PROT SERPL-MCNC: 7.3 G/DL (ref 5.7–8.2)
RBC # BLD AUTO: 4.51 X10(6)UL
SODIUM SERPL-SCNC: 135 MMOL/L (ref 136–145)
T PALLIDUM AB SER QL IA: NONREACTIVE
TRIGL SERPL-MCNC: 74 MG/DL (ref 30–149)
TSI SER-ACNC: 0.81 UIU/ML (ref 0.55–4.78)
VLDLC SERPL CALC-MCNC: 11 MG/DL (ref 0–30)
WBC # BLD AUTO: 5.9 X10(3) UL (ref 4–11)

## 2025-03-03 PROCEDURE — 85025 COMPLETE CBC W/AUTO DIFF WBC: CPT

## 2025-03-03 PROCEDURE — 80053 COMPREHEN METABOLIC PANEL: CPT

## 2025-03-03 PROCEDURE — 86695 HERPES SIMPLEX TYPE 1 TEST: CPT

## 2025-03-03 PROCEDURE — 86780 TREPONEMA PALLIDUM: CPT

## 2025-03-03 PROCEDURE — 36415 COLL VENOUS BLD VENIPUNCTURE: CPT

## 2025-03-03 PROCEDURE — 86696 HERPES SIMPLEX TYPE 2 TEST: CPT

## 2025-03-03 PROCEDURE — 84443 ASSAY THYROID STIM HORMONE: CPT

## 2025-03-03 PROCEDURE — 87389 HIV-1 AG W/HIV-1&-2 AB AG IA: CPT

## 2025-03-03 PROCEDURE — 80061 LIPID PANEL: CPT

## 2025-03-03 PROCEDURE — 86706 HEP B SURFACE ANTIBODY: CPT

## 2025-03-03 PROCEDURE — 86803 HEPATITIS C AB TEST: CPT

## 2025-03-03 PROCEDURE — 87340 HEPATITIS B SURFACE AG IA: CPT

## 2025-03-05 ENCOUNTER — LAB ENCOUNTER (OUTPATIENT)
Dept: LAB | Age: 30
End: 2025-03-05
Attending: FAMILY MEDICINE
Payer: COMMERCIAL

## 2025-03-05 DIAGNOSIS — Z11.3 SCREEN FOR SEXUALLY TRANSMITTED DISEASES: ICD-10-CM

## 2025-03-05 PROCEDURE — 87591 N.GONORRHOEAE DNA AMP PROB: CPT

## 2025-03-05 PROCEDURE — 87491 CHLMYD TRACH DNA AMP PROBE: CPT

## 2025-03-06 LAB
C TRACH DNA SPEC QL NAA+PROBE: NEGATIVE
N GONORRHOEA DNA SPEC QL NAA+PROBE: NEGATIVE

## 2025-03-07 LAB
HSV 1 GLYCOPROTEIN G, IGG: POSITIVE
HSV 2 GLYCOPROTEIN G, IGG: NEGATIVE

## 2025-03-27 DIAGNOSIS — F90.0 ADHD, PREDOMINANTLY INATTENTIVE TYPE: ICD-10-CM

## 2025-03-27 RX ORDER — LISDEXAMFETAMINE DIMESYLATE 40 MG/1
40 CAPSULE ORAL DAILY
Qty: 30 CAPSULE | Refills: 0 | Status: SHIPPED | OUTPATIENT
Start: 2025-03-28 | End: 2025-04-27

## 2025-03-27 NOTE — TELEPHONE ENCOUNTER
Patient calling requesting script that is due to be refilled tomorrow be sent to a different [facility] for this month. There is a nationwide shortage. Patient is also requesting brand name and not generic, as generic is the harder one to find and this location has the brand name in stock. Please advise    lisdexamfetamine (VYVANSE) 40 MG Oral Cap       [id number] - [place] [address] AT [address], [phone], [phone]

## 2025-04-01 ENCOUNTER — OFFICE VISIT (OUTPATIENT)
Dept: FAMILY MEDICINE CLINIC | Facility: CLINIC | Age: 30
End: 2025-04-01
Payer: COMMERCIAL

## 2025-04-01 VITALS
WEIGHT: 118.19 LBS | TEMPERATURE: 99 F | BODY MASS INDEX: 23.83 KG/M2 | OXYGEN SATURATION: 99 % | HEART RATE: 95 BPM | HEIGHT: 59 IN | SYSTOLIC BLOOD PRESSURE: 110 MMHG | DIASTOLIC BLOOD PRESSURE: 72 MMHG | RESPIRATION RATE: 21 BRPM

## 2025-04-01 DIAGNOSIS — J06.9 VIRAL URI: Primary | ICD-10-CM

## 2025-04-01 DIAGNOSIS — H65.93 FLUID LEVEL BEHIND TYMPANIC MEMBRANE, BILATERAL: ICD-10-CM

## 2025-04-01 DIAGNOSIS — H57.89 REDNESS OF RIGHT EYE: ICD-10-CM

## 2025-04-01 DIAGNOSIS — J02.9 SORE THROAT: ICD-10-CM

## 2025-04-01 LAB
CONTROL LINE PRESENT WITH A CLEAR BACKGROUND (YES/NO): YES YES/NO
KIT LOT #: NORMAL NUMERIC
STREP GRP A CUL-SCR: NEGATIVE

## 2025-04-01 PROCEDURE — 87880 STREP A ASSAY W/OPTIC: CPT

## 2025-04-01 PROCEDURE — 99213 OFFICE O/P EST LOW 20 MIN: CPT

## 2025-04-01 NOTE — PROGRESS NOTES
Subjective:   Patient ID: Marixa Rodriguez is a 29 year old female.    Patient presents to clinic with 3 days of symptoms including congestion and bilateral ear pain. States that noticed right eye was red on 03/31 this morning had dry drainage. Sore throat started about 2 days ago. Taking over the counter medications and tylenol. Denies known exposures.     Sore Throat   This is a new problem. The current episode started in the past 7 days. The problem has been unchanged. There has been no fever. Associated symptoms include congestion and ear pain. She has had no exposure to strep.       History/Other:   Review of Systems   Constitutional:  Negative for fever.   HENT:  Positive for congestion, ear pain and sore throat.    Eyes:  Positive for redness.   All other systems reviewed and are negative.    Current Outpatient Medications   Medication Sig Dispense Refill    lisdexamfetamine (VYVANSE) 40 MG Oral Cap Take 1 capsule (40 mg total) by mouth daily. 30 capsule 0    [START ON 4/28/2025] lisdexamfetamine (VYVANSE) 40 MG Oral Cap Take 1 capsule (40 mg total) by mouth daily. 30 capsule 0    clobetasol 0.05 % External Cream Apply 1 Application topically as needed.      cyclobenzaprine 10 MG Oral Tab Take 1 tablet (10 mg total) by mouth 3 (three) times daily as needed.      naproxen 500 MG Oral Tab Take 1 tablet (500 mg total) by mouth as needed.      valACYclovir (VALTREX) 1 G Oral Tab 1 TAB Q12H x 3 days at first onset of cold sore symptoms; (Patient taking differently: as needed. 1 TAB Q12H x 3 days at first onset of cold sore symptoms;) 30 tablet 3     Allergies:Allergies[1]    Objective:   Physical Exam  Vitals reviewed.   Constitutional:       General: She is not in acute distress.     Appearance: Normal appearance. She is not ill-appearing or toxic-appearing.   HENT:      Head: Normocephalic and atraumatic.      Right Ear: Ear canal and external ear normal. A middle ear effusion is present. Tympanic  membrane is not erythematous, retracted or bulging.      Left Ear: Ear canal and external ear normal. A middle ear effusion is present. Tympanic membrane is not erythematous, retracted or bulging.      Ears:      Comments: Clear fluid      Nose: Congestion present.      Mouth/Throat:      Mouth: Mucous membranes are moist.      Pharynx: Oropharynx is clear. Postnasal drip present. No posterior oropharyngeal erythema.   Cardiovascular:      Rate and Rhythm: Normal rate and regular rhythm.      Pulses: Normal pulses.      Heart sounds: Normal heart sounds.   Pulmonary:      Effort: Pulmonary effort is normal. No respiratory distress.      Breath sounds: Normal breath sounds. No wheezing, rhonchi or rales.   Musculoskeletal:         General: Normal range of motion.      Cervical back: Normal range of motion and neck supple.   Lymphadenopathy:      Cervical: No cervical adenopathy.   Skin:     General: Skin is warm and dry.      Capillary Refill: Capillary refill takes less than 2 seconds.   Neurological:      General: No focal deficit present.      Mental Status: She is alert and oriented to person, place, and time.   Psychiatric:         Mood and Affect: Mood normal.         Behavior: Behavior normal.         Assessment & Plan:   1. Viral URI    2. Sore throat    3. Fluid level behind tympanic membrane, bilateral    4. Redness of right eye        Orders Placed This Encounter   Procedures    Strep A Assay W/Optic     Results for orders placed or performed in visit on 04/01/25   Strep A Assay W/Optic    Collection Time: 04/01/25  4:21 PM   Result Value Ref Range    Strep Grp A Screen Negative Negative    Control Line Present with a clear background (yes/no) Yes Yes/No    Kit Lot # [id number] Numeric    Kit Expiration Date 12/20/2025 Date     Declined viral testing. Discussion about probable viral cause of symptoms. Discussion about supportive treatment including over the counter medications, hydration and rest.     Meds  This Visit:  Requested Prescriptions      No prescriptions requested or ordered in this encounter       Imaging & Referrals:  None         [1]   Allergies  Allergen Reactions    Sulfa Antibiotics HIVES

## 2025-04-17 ENCOUNTER — OFFICE VISIT (OUTPATIENT)
Dept: OBGYN CLINIC | Facility: CLINIC | Age: 30
End: 2025-04-17

## 2025-04-17 VITALS
WEIGHT: 121.88 LBS | HEART RATE: 74 BPM | DIASTOLIC BLOOD PRESSURE: 78 MMHG | BODY MASS INDEX: 25 KG/M2 | SYSTOLIC BLOOD PRESSURE: 119 MMHG

## 2025-04-17 DIAGNOSIS — O26.20 RECURRENT PREGNANCY LOSS, ANTEPARTUM CONDITION OR COMPLICATION (HCC): Primary | ICD-10-CM

## 2025-04-17 PROCEDURE — 99213 OFFICE O/P EST LOW 20 MIN: CPT | Performed by: OBSTETRICS & GYNECOLOGY

## 2025-04-17 RX ORDER — PROGESTERONE 200 MG/1
CAPSULE ORAL
Qty: 30 CAPSULE | Refills: 0 | Status: SHIPPED | OUTPATIENT
Start: 2025-04-17

## 2025-04-17 NOTE — PROGRESS NOTES
Chief Complaint   Patient presents with    Other     Missed abortions         Marixa Rodriguez is a 29 year old female who presents for consultation.    LMP: 3/31/2025.    Menses regular: yes.    Menstrual flow normal: yes.    Birth control or HRT: no.   Refill n/a  Last Pap Smear: 23 . Any history of abnormal paps: yes;    Gardasil:(age 9-46 y/o) up to date.   Any medication refills needed today?: no    Problems/concerns: Recurrent pregnancy losses. Most recent 2024.    Next Appt: Scheduled annual        Immunization History   Administered Date(s) Administered    >=3 YRS TRI  MULTIDOSE VIAL (94788) [facility]U [facility] 10/09/2023    Covid-19 Vaccine Pfizer 30 mcg/0.3 ml 2021, 2021    FLUZONE 6 months and older PFS 0.5 ml (37438) 10/25/2022    Hpv Virus Vaccine [facility] 2021, 2021, 2022    Influenza 10/25/2019, 10/27/2021    Influenza Vaccine, trivalent (IIV3), PF 0.5mL (85708) 2024    TDAP 2019       Medications - Current[1]    Allergies[2]    OB History    Para Term  AB Living   4 0 0 0 4 0   SAB IAB Ectopic Multiple Live Births   3 1 0 0 0      # Outcome Date GA Lbr Jair/2nd Weight Sex Type Anes PTL Lv   4 SAB 2024 7w0d          3 SAB 2024 9w0d          2 IAB  12w0d          1 SAB  9w0d              Gyn History      No data recorded      Latest Ref Rng & Units 2023     7:36 PM 2022     6:27 PM   RECENT PAP RESULTS   INTERPRETATION/RESULT: Negative for intraepithelial lesion or malignancy Negative for intraepithelial lesion or malignancy  Negative for intraepithelial lesion or malignancy    HPV Negative Negative             Past Medical History[3]    Past Surgical History[4]    Family History[5]     Tobacco  Allergies  Meds  Med Hx  Surg Hx  Fam Hx  Soc Hx        Social History     Socioeconomic History    Marital status: Single     Spouse name: Not on file    Number of children: Not on file    Years of education: Not on  file    Highest education level: Not on file   Occupational History    Not on file   Tobacco Use    Smoking status: Former     Types: Cigarettes     Start date: 7/17/2013    Smokeless tobacco: Never   Vaping Use    Vaping status: Never Used   Substance and Sexual Activity    Alcohol use: Yes     Comment: Occ.     Drug use: Never    Sexual activity: Yes     Partners: Male   Other Topics Concern    Caffeine Concern Yes    Exercise Yes    Seat Belt Yes    Special Diet No    Stress Concern No    Weight Concern No   Social History Narrative    Not on file     Social Drivers of Health     Food Insecurity: Not on file   Transportation Needs: Not on file   Stress: Not on file   Housing Stability: Low Risk  (7/7/2021)    Received from [facility]    Housing Stability     Mortgage Payment Concerns?: Not on file     Number of Places Lived in the Last Year: Not on file     Unstable Housing?: Not on file       /78   Pulse 74   Wt 121 lb 14.4 oz (55.3 kg)   LMP 03/31/2025 (Approximate)   Wt Readings from Last 3 Encounters:   04/17/25 121 lb 14.4 oz (55.3 kg)   04/01/25 118 lb 3.2 oz (53.6 kg)   11/14/24 118 lb (53.5 kg)     Health Maintenance   Topic Date Due    HPV Vaccines Ages 27-45 (2 - 3-dose series) 02/23/2022    COVID-19 Vaccine (3 - 2024-25 season) 09/01/2024    Annual Physical  11/14/2025    Pap Smear  07/18/2026    DTaP,Tdap,and Td Vaccines (2 - Td or Tdap) 11/14/2029    Influenza Vaccine  Completed    Annual Depression Screening  Completed    Pneumococcal Vaccine: Birth to 50yrs  Aged Out    Meningococcal B Vaccine  Aged Out         Review of Systems   General: Present- Feeling well.  Cardiovascular: Not Present- Chest Pain, Elevated Blood Pressure, Leg Pain and/or Swelling and Shortness of Breath.  Gastrointestinal: Not Present- Nausea and Vomiting.  Female Genitourinary: Not Present- Discharge, Dysmenorrhea, Dysuria, Excessive Menstrual Bleeding and Pelvic Pain.  Musculoskeletal: Not  Present- Leg Cramps and Swelling of Extremities.  Neurological: Not Present- Headaches.  Psychiatric: Not Present- Anxiety and Depression.  All other systems negative       Physical Exam   The physical exam findings are as follows:      General   Mental Status - Alert. General Appearance - Well Developed/Well Nourished/No acute distress/ NC/AT.      Note: More than 50% of this visit was spent in counseling or coordinating care for the following reason Consultations for pregnancy losses .  The total amount of time spent was 20 minutes.    1. Recurrent pregnancy loss, antepartum condition or complication (HCC)      - Progesterone pills were prescribed to be taken with +upt   Call office for hcg and progesterone with +upt   - Chromosomal and carrier labs were ordered for the patient and her partner   - Semen analysis was ordered for  her partner   Thrombophilic panel ordered   - Patient was educated on pregnancy loses, diet, exercise, and lifestyle changes  - All patient questions were answered       Galo LAMBERT      Patient seen and examined, Agree with assessment and plan of care documented by PA student.     Angeles Dominique MD           [1]   Current Outpatient Medications:     progesterone 200 MG Oral Cap, Take nightly for 10 nights every three months, Disp: 30 capsule, Rfl: 0    lisdexamfetamine (VYVANSE) 40 MG Oral Cap, Take 1 capsule (40 mg total) by mouth daily., Disp: 30 capsule, Rfl: 0    [START ON 4/28/2025] lisdexamfetamine (VYVANSE) 40 MG Oral Cap, Take 1 capsule (40 mg total) by mouth daily., Disp: 30 capsule, Rfl: 0    clobetasol 0.05 % External Cream, Apply 1 Application topically as needed., Disp: , Rfl:     cyclobenzaprine 10 MG Oral Tab, Take 1 tablet (10 mg total) by mouth 3 (three) times daily as needed., Disp: , Rfl:     naproxen 500 MG Oral Tab, Take 1 tablet (500 mg total) by mouth as needed., Disp: , Rfl:     valACYclovir (VALTREX) 1 G Oral Tab, 1 TAB Q12H x 3 days at first onset of  cold sore symptoms;, Disp: 30 tablet, Rfl: 3  [2]   Allergies  Allergen Reactions    Sulfa Antibiotics HIVES   [3]   Past Medical History:   Genital herpes simplex    First dx December 2023 no outbreaks since HSV1   [4]   Past Surgical History:  Procedure Laterality Date    [place] teeth removed      2019   [5]   Family History  Problem Relation Age of Onset    Diabetes Maternal Grandmother     Diabetes Maternal Grandfather     Hypertension Maternal Grandfather

## 2025-06-10 PROBLEM — M54.50 CHRONIC BILATERAL LOW BACK PAIN WITHOUT SCIATICA: Status: ACTIVE | Noted: 2025-06-10

## 2025-06-10 PROBLEM — G89.29 CHRONIC BILATERAL LOW BACK PAIN WITHOUT SCIATICA: Status: ACTIVE | Noted: 2025-06-10

## 2025-06-18 ENCOUNTER — APPOINTMENT (OUTPATIENT)
Dept: GENERAL RADIOLOGY | Age: 30
End: 2025-06-18
Attending: EMERGENCY MEDICINE
Payer: COMMERCIAL

## 2025-06-18 ENCOUNTER — HOSPITAL ENCOUNTER (OUTPATIENT)
Age: 30
Discharge: HOME | End: 2025-06-18
Attending: EMERGENCY MEDICINE
Payer: COMMERCIAL

## 2025-06-18 VITALS
TEMPERATURE: 98 F | OXYGEN SATURATION: 100 % | HEART RATE: 83 BPM | SYSTOLIC BLOOD PRESSURE: 109 MMHG | WEIGHT: 120 LBS | RESPIRATION RATE: 16 BRPM | HEIGHT: 59 IN | BODY MASS INDEX: 24.19 KG/M2 | DIASTOLIC BLOOD PRESSURE: 65 MMHG

## 2025-06-18 DIAGNOSIS — S46.912A STRAIN OF LEFT SHOULDER, INITIAL ENCOUNTER: Primary | ICD-10-CM

## 2025-06-18 PROCEDURE — 99213 OFFICE O/P EST LOW 20 MIN: CPT

## 2025-06-18 PROCEDURE — 73030 X-RAY EXAM OF SHOULDER: CPT | Performed by: EMERGENCY MEDICINE

## 2025-06-18 PROCEDURE — 99214 OFFICE O/P EST MOD 30 MIN: CPT

## 2025-06-18 RX ORDER — NAPROXEN 500 MG/1
500 TABLET ORAL 2 TIMES DAILY WITH MEALS
Qty: 20 TABLET | Refills: 0 | Status: SHIPPED | OUTPATIENT
Start: 2025-06-18 | End: 2025-06-28

## 2025-06-18 NOTE — DISCHARGE INSTRUCTIONS
Apply cold compresses to shoulder  Take naproxen twice a day as needed for pain  Follow up [facility] orthopedics if pain does not improve

## 2025-06-18 NOTE — ED INITIAL ASSESSMENT (HPI)
C/o left shoulder injury/pain for a week. Was doing new martial arts technique, fell the ground landed left shoulder.

## 2025-06-18 NOTE — ED PROVIDER NOTES
Patient Seen in: [facility]        History  Chief Complaint   Patient presents with    Shoulder Injury     Stated Complaint: arm/hand pain    Subjective:   HPI            29-year-old female presents to the immediate care for complaints of left shoulder pain.  Patient was participating in martial arts when she fell onto her left shoulder.  Since then she has been having persistent pain for the last 2 weeks.  Denies any obvious bony deformity.  She has pain with abduction of the shoulder.  Denies any distal numbness or tingling.    Objective:     Past Medical History:    Genital herpes simplex    First dx December 2023 no outbreaks since HSV1              No pertinent past surgical history.              No pertinent social history.            Review of Systems    Positive for stated complaint: arm/hand pain  Other systems are as noted in HPI.  Constitutional and vital signs reviewed.      All other systems reviewed and negative except as noted above.                  Physical Exam    ED Triage Vitals [06/18/25 1550]   /65   Pulse 83   Resp 16   Temp 97.8 °F (36.6 °C)   Temp src Oral   SpO2 100 %   O2 Device None (Room air)       Current Vitals:   Vital Signs  BP: 109/65  Pulse: 83  Resp: 16  Temp: 97.8 °F (36.6 °C)  Temp src: Oral    Oxygen Therapy  SpO2: 100 %  O2 Device: None (Room air)            Physical Exam     General: Alert and oriented. No acute distress.  HEENT: Normocephalic. No evidence of trauma. Extraocular movements are intact.  Left shoulder: No visible deformity noted.  No angular deformity noted to the clavicle.  She has some mild tenderness along her clavicle and her AC joint.  Patient does have pain to abduction.  Sensations intact.  Extremities: No evidence of deformity. No clubbing or cyanosis.  Neuro: No focal deficit is noted.      ED Course  Labs Reviewed - No data to display  Differential includes a left shoulder contusion versus strain versus AC joint separation  versus clavicle fracture versus subluxation versus labral tear versus rotator cuff injury.  Left shoulder series was ordered.  This is negative for any acute osseous injury.    Patient will be discharged home with NSAIDs for pain control.  She will be given follow-up with [facility] orthopedics for further evaluation.                Patient was screened and evaluated during this visit.   As a treating physician attending to the patient, I determined, within reasonable clinical confidence and prior to discharge, that an emergency medical condition was not or was no longer present.  There was no indication for further evaluation, treatment or admission on an emergency basis.  Comprehensive verbal and written discharge and follow-up instructions were provided to help prevent relapse or worsening.  Patient was instructed to follow-up with her primary care provider for further evaluation and treatment, but to return immediately to the ER for worsening, concerning, new, changing or persisting symptoms.  I discussed the case with the patient and they had no questions, complaints, or concerns.  Patient felt comfortable going home.    ^^Please note that this report has been produced using speech recognition software and may contain errors related to that system including, but not limited to, errors in grammar, punctuation, and spelling, as well as words and phrases that possibly may have been recognized inappropriately.  If there are any questions or concerns, contact the dictating provider for clarification      Medical Decision Making      Disposition and Plan     Clinical Impression:  1. Strain of left shoulder, initial encounter         Disposition:  There is no disposition on file for this visit.  There is no disposition time on file for this visit.    Follow-up:  [facility], [address] [place] [id number] [phone]  Schedule an  appointment as soon as possible for a visit   As needed, If symptoms worsen          Medications Prescribed:  Current Discharge Medication List        START taking these medications    Details   !! naproxen 500 MG Oral Tab Take 1 tablet (500 mg total) by mouth 2 (two) times daily with meals for 20 doses.  Qty: 20 tablet, Refills: 0       !! - Potential duplicate medications found. Please discuss with provider.                Supplementary Documentation:

## 2025-07-23 DIAGNOSIS — Z86.19 HISTORY OF PCR DNA POSITIVE FOR HSV1: ICD-10-CM

## 2025-07-23 NOTE — TELEPHONE ENCOUNTER
Patient asking if Rx for :  Medication Quantity Refills Start End   valACYclovir (VALTREX) 1 G Oral Tab 30 tablet 3 11/14/2024    Can be transferred to the [facility] in [place] on file

## 2025-07-24 RX ORDER — VALACYCLOVIR HYDROCHLORIDE 1 G/1
TABLET, FILM COATED ORAL
Qty: 30 TABLET | Refills: 1 | Status: SHIPPED | OUTPATIENT
Start: 2025-07-24

## 2025-08-27 ENCOUNTER — LAB ENCOUNTER (OUTPATIENT)
Dept: LAB | Facility: HOSPITAL | Age: 30
End: 2025-08-27
Attending: FAMILY MEDICINE

## 2025-08-27 DIAGNOSIS — Z11.3 SCREEN FOR SEXUALLY TRANSMITTED DISEASES: ICD-10-CM

## 2025-08-27 LAB
HBV SURFACE AG SER-ACNC: <0.1
HBV SURFACE AG SERPL QL IA: NONREACTIVE
HCV AB SERPL QL IA: NONREACTIVE
T PALLIDUM AB SER QL IA: NONREACTIVE

## 2025-08-27 PROCEDURE — 86780 TREPONEMA PALLIDUM: CPT

## 2025-08-27 PROCEDURE — 86803 HEPATITIS C AB TEST: CPT

## 2025-08-27 PROCEDURE — 87389 HIV-1 AG W/HIV-1&-2 AB AG IA: CPT

## 2025-08-27 PROCEDURE — 87340 HEPATITIS B SURFACE AG IA: CPT

## 2025-08-27 PROCEDURE — 87491 CHLMYD TRACH DNA AMP PROBE: CPT

## 2025-08-27 PROCEDURE — 87591 N.GONORRHOEAE DNA AMP PROB: CPT

## 2025-08-27 PROCEDURE — 36415 COLL VENOUS BLD VENIPUNCTURE: CPT

## 2025-08-28 LAB
C TRACH DNA SPEC QL NAA+PROBE: NEGATIVE
N GONORRHOEA DNA SPEC QL NAA+PROBE: NEGATIVE

## (undated) NOTE — MR AVS SNAPSHOT
After Visit Summary   7/18/2023    Sharmin Perez   MRN: [id number]           Visit Information     Date & Time  7/18/2023  3:30 PM Provider  Clive Rea MD [facility]  [facility] Dept. Phone  [phone]      Your Vitals Were  Most recent update: 7/18/2023  3:58 PM    BP   98/64    Wt   127 lb 3.3 oz    LMP   07/05/2023           Allergies as of 7/18/2023  Review status set to Review Complete on 7/18/2023       Noted Reaction Type Reactions    Sulfa Antibiotics 06/02/2022    HIVES      Your Current Medications        Dosage    VYVANSE 30 MG Oral Cap Take 1 capsule (30 mg total) by mouth every morning. Diagnoses for This Visit    Encounter for well woman exam with routine gynecological exam   [2045051]  -  Primary  Screening for STDs (sexually transmitted diseases)   [986728]             We Ordered the Following     Normal Orders This Visit    CHLAMYDIA/GONOCOCCUS, KIERRA [6174786 CUSTOM]     THINPREP PAP WITH HPV REFLEX REQUEST B [YSL8252 CUSTOM]     THINPREP PAP WITH HPV REFLEX REQUEST [QZC1232 CUSTOM]     VAGINITIS/VAGINOSIS, DNA PROBE [[id number] CUSTOM]     Future Labs/Procedures Expected by Expires    CHLAMYDIA/GONOCOCCUS, KIERRA [[id number] CUSTOM]  7/18/2023 (Approximate) 7/18/2024    HCV ANTIBODY [[id number] CUSTOM]  7/18/2023 (Approximate) 7/18/2024    HEPATITIS B SURFACE ANTIGEN [4743648 CUSTOM]  7/18/2023 (Approximate) 7/18/2024    HIV AG AB COMBO [[id number] CUSTOM]  7/18/2023 (Approximate) 7/18/2024    T PALLIDUM SCREENING CASCADE [1906846 CPT(R)]  7/18/2023 (Approximate) 7/18/2024    THINPREP PAP WITH HPV REFLEX REQUEST B [[id number] CUSTOM]  7/18/2023 7/17/2024                Did you know that [facility] primary care physicians now offer Video Visits through [address] for adult patients for a variety of conditions such as allergies, back pain and cold symptoms?  Skip the drive and waiting room and online chat with a doctor face-to-face using your web-cam enabled computer or mobile device wherever you are. Video Visits cost $50 and can be paid hassle-free using a credit, debit, or health savings card. Not active on wikifolio? Ask us how to get signed up today! If you receive a survey from [employer], please take a few minutes to complete it and provide feedback. We strive to deliver the best patient experience and are looking for ways to make improvements. Your feedback will help us do so. For more information on [employer], please visit [email or web address]           No text in SmartText           No text in SmartText

## (undated) NOTE — LETTER
Date: 6/13/2024    Patient Name: Marixa Rodriguez          To Whom it may concern:    This letter has been written at the patient's request. The above patient was seen at [facility] for treatment of a medical condition.    This patient should be excused from attending work/school from 06/12/2024 through 06/16/2024.    The patient may return to work/school on 06/17/2024 without restrictions.        Sincerely,    Angeles Dominique MD

## (undated) NOTE — LETTER
10/2/2024              Marixa Rodriguez        [address] [facility] [address]         To whom it may concern, the above named patient is under my care. She may return to work on 10/4/24.        Sincerely,    Angeles Domiinque MD

## (undated) NOTE — LETTER
Date & Time: 2/26/2024, 9:47 AM  Patient: Marixa Rodriguez  Encounter Provider(s):    Peter Patricia APRN       To Whom It May Concern:    Marixa Rodriguez was seen and treated in our department on 2/26/2024. She should not return to work until 2/29/24 .    If you have any questions or concerns, please do not hesitate to call.        _____________________________  Physician/APC Signature

## (undated) NOTE — MR AVS SNAPSHOT
After Visit Summary   6/2/2022    Tim Tay   MRN: [id number]           Visit Information     Date & Time  6/2/2022  5:45 PM Provider  Becky Fothergill, MD [address] [place], [place] [address]. Phone  [phone] Were  Most recent update: 6/2/2022  5:59 PM    BP   102/62    Wt   117 lb    LMP   05/13/2022 (Exact Date)           Allergies as of 6/2/2022  Review status set to Review Complete on 6/2/2022       Noted Reaction Type Reactions    Sulfa Antibiotics 06/02/2022    HIVES      Your Current Medications        Dosage    VYVANSE 20 MG Oral Cap       Diagnoses for This Visit    Women's annual routine gynecological examination   [5791708]  -  Primary  Screening for STDs (sexually transmitted diseases)   [789693]             We Ordered the Following     Normal Orders This Visit    CHLAMYDIA/GONOCOCCUS, KIERRA [[id number] CUSTOM]     GENITAL VAGINOSIS SCREEN [VLF2335 CUSTOM]     THINPREP PAP WITH HPV REFLEX REQUEST B [[id number] CUSTOM]     THINPREP PAP WITH HPV REFLEX REQUEST [[id number] CUSTOM]     Future Labs/Procedures Expected by Expires    CHLAMYDIA/GONOCOCCUS, KIERRA [[id number] CUSTOM]  6/2/2022 (Approximate) 6/2/2023    GENITAL VAGINOSIS SCREEN [VAX2014 CUSTOM]  6/2/2022 (Approximate) 6/2/2023    HCV ANTIBODY [8017115 CUSTOM]  6/2/2022 (Approximate) 6/2/2023    HEPATITIS B SURFACE ANTIGEN [6835831 CUSTOM]  6/2/2022 (Approximate) 6/2/2023    HIV AG AB COMBO [[id number] CUSTOM]  6/2/2022 (Approximate) 6/2/2023    HSV 1 AND 2 IGM ABS, INDIRECT [6095809 CUSTOM]  6/2/2022 (Approximate) 6/2/2023    T PALLIDUM SCREENING CASCADE [5869587 CPT(R)]  6/2/2022 (Approximate) 6/2/2023    THINPREP PAP WITH HPV REFLEX REQUEST B [[id number] CUSTOM]  6/2/2022 6/2/2023                Did you know that [facility] primary care physicians now offer Video Visits through [address] for adult patients for a variety of conditions such as allergies, back pain and cold symptoms?  Skip the drive and waiting room and online chat with a doctor face-to-face using your web-cam enabled computer or mobile device wherever you are. Video Visits cost $50 and can be paid hassle-free using a credit, debit, or health savings card. Not active on [email or web address]? Ask us how to get signed up today! If you receive a survey from [employer], please take a few minutes to complete it and provide feedback. We strive to deliver the best patient experience and are looking for ways to make improvements. Your feedback will help us do so. For more information on [employer], please visit [email or web address]           No text in SmartText           No text in SmartText

## (undated) NOTE — LETTER
04/21/25        Marixa Rodriguez  [address] [address]      Dear Marixa,    Our records indicate that you have outstanding lab work and or testing that was ordered for you and has not yet been completed:  Orders Placed This Encounter      PROTHROMBIN TIME      Lupus Anticoagulant/Antiphospholipid Panel      Factor VIII,Prot C,Prot S,Antithrombin Activity Panel      Chromosome Analysis Peripheral Blood    To provide you with the best possible care, please complete these orders at your earliest convenience. If you have recently completed these orders please disregard this letter.     If you have any questions please call the office at Dept: [phone].     Thank you,       Angeles Dominique MD